# Patient Record
Sex: FEMALE | Race: WHITE | Employment: FULL TIME | URBAN - METROPOLITAN AREA
[De-identification: names, ages, dates, MRNs, and addresses within clinical notes are randomized per-mention and may not be internally consistent; named-entity substitution may affect disease eponyms.]

---

## 2017-06-19 RX ORDER — CLOBETASOL PROPIONATE 0.5 MG/G
CREAM TOPICAL
Qty: 30 G | Refills: 0 | Status: SHIPPED | OUTPATIENT
Start: 2017-06-19 | End: 2017-07-24 | Stop reason: SDUPTHER

## 2017-06-19 RX ORDER — CLOBETASOL PROPIONATE 0.5 MG/G
CREAM TOPICAL
Qty: 30 G | Refills: 1 | OUTPATIENT
Start: 2017-06-19

## 2017-06-19 NOTE — TELEPHONE ENCOUNTER
AE scheduled 07/24/2017. Patient aware that rx was sent to pharmacy of choice. Patient also thought she would need a refill of her OCP's but after looking at the calendar, patient thinks she will be ok. Patient advised to contact our office back if she finds that she will run out of her pills before her appointment. Patient verbalized understanding.

## 2017-07-24 ENCOUNTER — OFFICE VISIT (OUTPATIENT)
Dept: OBGYN CLINIC | Age: 47
End: 2017-07-24

## 2017-07-24 VITALS
BODY MASS INDEX: 26.43 KG/M2 | SYSTOLIC BLOOD PRESSURE: 130 MMHG | HEIGHT: 67 IN | WEIGHT: 168.4 LBS | DIASTOLIC BLOOD PRESSURE: 78 MMHG

## 2017-07-24 DIAGNOSIS — Z01.419 ENCOUNTER FOR GYNECOLOGICAL EXAMINATION (GENERAL) (ROUTINE) WITHOUT ABNORMAL FINDINGS: Primary | ICD-10-CM

## 2017-07-24 DIAGNOSIS — N90.4 LICHEN SCLEROSUS ET ATROPHICUS OF THE VULVA: ICD-10-CM

## 2017-07-24 RX ORDER — NORGESTIMATE AND ETHINYL ESTRADIOL 7DAYSX3 28
1 KIT ORAL DAILY
Qty: 3 PACKAGE | Refills: 4 | Status: SHIPPED | OUTPATIENT
Start: 2017-07-24 | End: 2018-07-26 | Stop reason: SDUPTHER

## 2017-07-24 RX ORDER — CLOBETASOL PROPIONATE 0.5 MG/G
CREAM TOPICAL
Qty: 60 G | Refills: 2 | Status: SHIPPED | OUTPATIENT
Start: 2017-07-24 | End: 2018-08-13 | Stop reason: SDUPTHER

## 2017-07-24 NOTE — PROGRESS NOTES
Jung Dodge is a ,  56 y.o. female [facility]  2017 who presents for her annual checkup. She is having no significant problems. Menstrual status:    Her periods are normal in flow. She is using one to two pads or tampons per day, usually regular and occur every 26-30 days. She denies dysmenorrhea. She reports no premenstrual symptoms. The patient is not using HRT. Contraception:    The current method of family planning is OCP (estrogen/progesterone). Sexual history:    She  reports that she currently engages in sexual activity and has had male partners. She reports using the following method of birth control/protection: Pill. Medical conditions:    Since her last annual GYN exam about one year ago, she has had the following changes in her health history: none. Pap and Mammogram History:    Her most recent Pap smear was normal, HPV neg obtained 16 . The patient had a recent mammogram 2017 which was negative for malignancy. Breast Cancer History/Substance Abuse:    She has no family history of breast cancer. Osteoporosis History:    Family history does not include a first or second degree relative with osteopenia or osteoporosis. A bone density scan was not previously obtained. She is currently taking calcium and vit D. Past Medical History:   Diagnosis Date    Acne     Depression     Lichen sclerosus     OCD (obsessive compulsive disorder)      Past Surgical History:   Procedure Laterality Date    HX BREAST BIOPSY  3/21/2012    normal path    HX OOPHORECTOMY Right 2004     Current Outpatient Prescriptions   Medication Sig Dispense Refill    clobetasol (TEMOVATE) 0.05 % topical cream APPLY TO AFFECTED AREA 2 TIMES A WEEK AS DIRECTED 30 g 0    norgestimate-ethinyl estradiol (TRI-PREVIFEM, 28,) 0.18/0.215/0.25 mg-35 mcg (28) tab Take 1 Tab by mouth daily.  3 Package 4    fluvoxaMINE (LUVOX) 100 mg tablet        Allergies: Review of patient's allergies indicates no known allergies. Social History     Social History    Marital status:      Spouse name: N/A    Number of children: N/A    Years of education: N/A     Occupational History    Not on file. Social History Main Topics    Smoking status: Never Smoker    Smokeless tobacco: Never Used    Alcohol use Not on file    Drug use: Not on file    Sexual activity: Yes     Partners: Male     Birth control/ protection: Pill     Other Topics Concern    Not on file     Social History Narrative     Tobacco History:  reports that she has never smoked. She has never used smokeless tobacco.  Alcohol Abuse:  has no alcohol history on file. Drug Abuse:  has no drug history on file. There is no problem list on file for this patient.         Review of Systems - History obtained from the patient  Constitutional: negative for weight loss, fever, night sweats  HEENT: negative for hearing loss, earache, congestion, snoring, sorethroat  CV: negative for chest pain, palpitations, edema  Resp: negative for cough, shortness of breath, wheezing  GI: negative for change in bowel habits, abdominal pain, black or bloody stools  : negative for frequency, dysuria, hematuria, vaginal discharge  MSK: negative for back pain, joint pain, muscle pain  Breast: negative for breast lumps, nipple discharge, galactorrhea  Skin :negative for itching, rash, hives  Neuro: negative for dizziness, headache, confusion, weakness  Psych: negative for anxiety, depression, change in mood  Heme/lymph: negative for bleeding, bruising, pallor    Physical Exam    Visit Vitals    /78 (BP 1 Location: Right arm, BP Patient Position: Sitting)    Ht 5' 7\" (1.702 m)    Wt 168 lb 6.4 oz (76.4 kg)    LMP 06/21/2017    BMI 26.38 kg/m2     Constitutional  · Appearance: well-nourished, well developed, alert, in no acute distress    HENT  · Head and Face: appears normal    Neck  · Inspection/Palpation: normal appearance, no masses or tenderness  · Lymph Nodes: no lymphadenopathy present  · Thyroid: gland size normal, nontender, no nodules or masses present on palpation    Chest  · Respiratory Effort: breathing normal  · Auscultation: normal breath sounds    Cardiovascular  · Heart:  · Auscultation: regular rate and rhythm without murmur    Breasts  · Inspection of Breasts: breasts symmetrical, no skin changes, no discharge present, nipple appearance normal, no skin retraction present  · Palpation of Breasts and Axillae: no masses present on palpation, no breast tenderness  · Axillary Lymph Nodes: no lymphadenopathy present    Gastrointestinal  · Abdominal Examination: abdomen non-tender to palpation, normal bowel sounds, no masses present  · Liver and spleen: no hepatomegaly present, spleen not palpable  · Hernias: no hernias identified    Skin  · General Inspection: no rash, no lesions identified    Neurologic/Psychiatric  · Mental Status:  · Orientation: grossly oriented to person, place and time  · Mood and Affect: mood normal, affect appropriate    Genitourinary  · External Genitalia: normal appearance for age, no discharge present, no tenderness present, no inflammatory lesions present, no masses present, no atrophy present  · Vagina: normal vaginal vault without central or paravaginal defects, no discharge present, no inflammatory lesions present, no masses present  · Bladder: non-tender to palpation  · Urethra: appears normal  · Cervix: normal   · Uterus: normal size, shape and consistency  · Adnexa: no adnexal tenderness present, no adnexal masses present  · Perineum: perineum within normal limits, no evidence of trauma, no rashes or skin lesions present  · Anus: anus within normal limits, no hemorrhoids present  · Inguinal Lymph Nodes: no lymphadenopathy present    Assessment:  Routine gynecologic examination  Her current medical status is satisfactory with no evidence of significant gynecologic issues.   Lichen sclerosus  Plan:  Counseled re: diet, exercise, healthy lifestyle  Return for yearly wellness visits  Rec annual mammogram  Cont OC  Refill clobetasol

## 2017-07-24 NOTE — PATIENT INSTRUCTIONS
Breast Self-Exam: Care Instructions  Your Care Instructions  A breast self-exam is when you check your breasts for lumps or changes. This regular exam helps you learn how your breasts normally look and feel. Most breast problems or changes are not because of cancer. Breast self-exam is not a substitute for a mammogram. Having regular breast exams by your doctor and regular mammograms improve your chances of finding any problems with your breasts. Some women set a time each month to do a step-by-step breast self-exam. Other women like a less formal system. They might look at their breasts as they brush their teeth, or feel their breasts once in a while in the shower. If you notice a change in your breast, tell your doctor. Follow-up care is a key part of your treatment and safety. Be sure to make and go to all appointments, and call your doctor if you are having problems. Its also a good idea to know your test results and keep a list of the medicines you take. How do you do a breast self-exam?  · The best time to examine your breasts is usually one week after your menstrual period begins. Your breasts should not be tender then. If you do not have periods, you might do your exam on a day of the month that is easy to remember. · To examine your breasts:  ¨ Remove all your clothes above the waist and lie down. When you are lying down, your breast tissue spreads evenly over your chest wall, which makes it easier to feel all your breast tissue. ¨ Use the pads--not the fingertips--of the 3 middle fingers of your left hand to check your right breast. Move your fingers slowly in small coin-sized circles that overlap. ¨ Use three levels of pressure to feel of all your breast tissue. Use light pressure to feel the tissue close to the skin surface. Use medium pressure to feel a little deeper. Use firm pressure to feel your tissue close to your breastbone and ribs.  Use each pressure level to feel your breast tissue before moving on to the next spot. ¨ Check your entire breast, moving up and down as if following a strip from the collarbone to the bra line, and from the armpit to the ribs. Repeat until you have covered the entire breast.  ¨ Repeat this procedure for your left breast, using the pads of the 3 middle fingers of your right hand. · To examine your breasts while in the shower:  ¨ Place one arm over your head and lightly soap your breast on that side. ¨ Using the pads of your fingers, gently move your hand over your breast (in the strip pattern described above), feeling carefully for any lumps or changes. ¨ Repeat for the other breast.  · Have your doctor inspect anything you notice to see if you need further testing. Where can you learn more? Go to http://mariama-gely.info/. Enter P148 in the search box to learn more about \"Breast Self-Exam: Care Instructions. \"  Current as of: July 26, 2016  Content Version: 11.3  © 9527-0283 [employer]. Care instructions adapted under license by [employer] (which disclaims liability or warranty for this information). If you have questions about a medical condition or this instruction, always ask your healthcare professional. [place] [address] any warranty or liability for your use of this information.

## 2018-07-25 RX ORDER — NORGESTIMATE AND ETHINYL ESTRADIOL 7DAYSX3 28
KIT ORAL
Qty: 84 TAB | Refills: 3 | OUTPATIENT
Start: 2018-07-25

## 2018-07-26 RX ORDER — NORGESTIMATE AND ETHINYL ESTRADIOL 7DAYSX3 28
1 KIT ORAL DAILY
Qty: 1 PACKAGE | Refills: 0 | Status: SHIPPED | OUTPATIENT
Start: 2018-07-26 | End: 2018-08-13 | Stop reason: SDUPTHER

## 2018-07-26 NOTE — TELEPHONE ENCOUNTER
AE scheduled 8/13/18 and patient needs 1 pack to hold her over until the appt time. rx sent per Md order, patient aware.

## 2018-08-13 ENCOUNTER — OFFICE VISIT (OUTPATIENT)
Dept: OBGYN CLINIC | Age: 48
End: 2018-08-13

## 2018-08-13 VITALS
BODY MASS INDEX: 26.78 KG/M2 | WEIGHT: 170.6 LBS | DIASTOLIC BLOOD PRESSURE: 70 MMHG | SYSTOLIC BLOOD PRESSURE: 118 MMHG | HEIGHT: 67 IN

## 2018-08-13 DIAGNOSIS — N90.4 LICHEN SCLEROSUS ET ATROPHICUS OF THE VULVA: ICD-10-CM

## 2018-08-13 DIAGNOSIS — Z01.419 ENCOUNTER FOR GYNECOLOGICAL EXAMINATION WITHOUT ABNORMAL FINDING: Primary | ICD-10-CM

## 2018-08-13 RX ORDER — CLOBETASOL PROPIONATE 0.5 MG/G
CREAM TOPICAL
Qty: 60 G | Refills: 2 | Status: SHIPPED | OUTPATIENT
Start: 2018-08-13 | End: 2019-10-16 | Stop reason: SDUPTHER

## 2018-08-13 RX ORDER — NORGESTIMATE AND ETHINYL ESTRADIOL 7DAYSX3 28
1 KIT ORAL DAILY
Qty: 3 PACKAGE | Refills: 4 | Status: SHIPPED | OUTPATIENT
Start: 2018-08-13 | End: 2019-10-10 | Stop reason: SDUPTHER

## 2018-08-13 NOTE — PATIENT INSTRUCTIONS
Breast Self-Exam: Care Instructions  Your Care Instructions    A breast self-exam is when you check your breasts for lumps or changes. This regular exam helps you learn how your breasts normally look and feel. Most breast problems or changes are not because of cancer. Breast self-exam is not a substitute for a mammogram. Having regular breast exams by your doctor and regular mammograms improve your chances of finding any problems with your breasts. Some women set a time each month to do a step-by-step breast self-exam. Other women like a less formal system. They might look at their breasts as they brush their teeth, or feel their breasts once in a while in the shower. If you notice a change in your breast, tell your doctor. Follow-up care is a key part of your treatment and safety. Be sure to make and go to all appointments, and call your doctor if you are having problems. It's also a good idea to know your test results and keep a list of the medicines you take. How do you do a breast self-exam?  · The best time to examine your breasts is usually one week after your menstrual period begins. Your breasts should not be tender then. If you do not have periods, you might do your exam on a day of the month that is easy to remember. · To examine your breasts:  ¨ Remove all your clothes above the waist and lie down. When you are lying down, your breast tissue spreads evenly over your chest wall, which makes it easier to feel all your breast tissue. ¨ Use the pads-not the fingertips-of the 3 middle fingers of your left hand to check your right breast. Move your fingers slowly in small coin-sized circles that overlap. ¨ Use three levels of pressure to feel of all your breast tissue. Use light pressure to feel the tissue close to the skin surface. Use medium pressure to feel a little deeper. Use firm pressure to feel your tissue close to your breastbone and ribs.  Use each pressure level to feel your breast tissue before moving on to the next spot. ¨ Check your entire breast, moving up and down as if following a strip from the collarbone to the bra line, and from the armpit to the ribs. Repeat until you have covered the entire breast.  ¨ Repeat this procedure for your left breast, using the pads of the 3 middle fingers of your right hand. · To examine your breasts while in the shower:  ¨ Place one arm over your head and lightly soap your breast on that side. ¨ Using the pads of your fingers, gently move your hand over your breast (in the strip pattern described above), feeling carefully for any lumps or changes. ¨ Repeat for the other breast.  · Have your doctor inspect anything you notice to see if you need further testing. Where can you learn more? Go to http://mariama-gely.info/. Enter P148 in the search box to learn more about \"Breast Self-Exam: Care Instructions. \"  Current as of: May 12, 2017  Content Version: 11.7  © 5829-2185 [employer]. Care instructions adapted under license by [employer] (which disclaims liability or warranty for this information). If you have questions about a medical condition or this instruction, always ask your healthcare professional. [place] [address] any warranty or liability for your use of this information.

## 2018-08-13 NOTE — PROGRESS NOTES
Reddy Hernandez is a ,  53 y.o. female [place] whose LMP was on 2018 who presents for her annual checkup. She has noticed some rectal irritation. Patient states she noticed \"pimple-like\" bumps where her underwear hits  She was putting hemorrhoid cream on it. Menstrual status:    Her periods are normal in flow. She is using one to two pads or tampons per day, usually regular and occur every 26-30 days. She denies dysmenorrhea. She reports no premenstrual symptoms. The patient is not using HRT. Contraception:    The current method of family planning is OCP (estrogen/progesterone). Sexual history:    She  reports that she currently engages in sexual activity and has had male partners. She reports using the following method of birth control/protection: Pill. Medical conditions:    Since her last annual GYN exam about one year ago, she has had the following changes in her health history: none. Pap and Mammogram History:    Her most recent Pap smear was normal, HPV neg obtained 2016. The patient had a recent mammogram 2018 which was negative for malignancy. Breast Cancer History/Substance Abuse:    She has no family history of breast cancer. Osteoporosis History:    Family history does not include a first or second degree relative with osteopenia or osteoporosis. She is not currently taking calcium and vit D. Past Medical History:   Diagnosis Date    Acne     Depression     Lichen sclerosus     OCD (obsessive compulsive disorder)      Past Surgical History:   Procedure Laterality Date    HX BREAST BIOPSY  3/21/2012    normal path    HX OOPHORECTOMY Right 2004     Current Outpatient Prescriptions   Medication Sig Dispense Refill    norgestimate-ethinyl estradiol (TRI-PREVIFEM, 28,) 0.18/0.215/0.25 mg-35 mcg (28) tab Take 1 Tab by mouth daily.  1 Package 0    clobetasol (TEMOVATE) 0.05 % topical cream APPLY TO AFFECTED AREA 2 TIMES A WEEK AS DIRECTED 60 g 2    fluvoxaMINE (LUVOX) 100 mg tablet        Allergies: Review of patient's allergies indicates no known allergies. Social History     Social History    Marital status:      Spouse name: N/A    Number of children: N/A    Years of education: N/A     Occupational History    Not on file. Social History Main Topics    Smoking status: Never Smoker    Smokeless tobacco: Never Used    Alcohol use Not on file    Drug use: Not on file    Sexual activity: Yes     Partners: Male     Birth control/ protection: Pill     Other Topics Concern    Not on file     Social History Narrative     Tobacco History:  reports that she has never smoked. She has never used smokeless tobacco.  Alcohol Abuse:  has no alcohol history on file. Drug Abuse:  has no drug history on file.   Patient Active Problem List   Diagnosis Code    Lichen sclerosus et atrophicus of the vulva N90.4         Review of Systems - History obtained from the patient  Constitutional: negative for weight loss, fever, night sweats  HEENT: negative for hearing loss, earache, congestion, snoring, sorethroat  CV: negative for chest pain, palpitations, edema  Resp: negative for cough, shortness of breath, wheezing  GI: negative for change in bowel habits, abdominal pain, black or bloody stools  : negative for frequency, dysuria, hematuria, vaginal discharge  MSK: negative for back pain, joint pain, muscle pain  Breast: negative for breast lumps, nipple discharge, galactorrhea  Skin :negative for itching, rash, hives  Neuro: negative for dizziness, headache, confusion, weakness  Psych: negative for anxiety, depression, change in mood  Heme/lymph: negative for bleeding, bruising, pallor    Physical Exam    Visit Vitals    /70    Ht 5' 7\" (1.702 m)    Wt 170 lb 9.6 oz (77.4 kg)    LMP 08/02/2018    BMI 26.72 kg/m2     Constitutional  · Appearance: well-nourished, well developed, alert, in no acute distress    HENT  · Head and Face: appears normal    Neck  · Inspection/Palpation: normal appearance, no masses or tenderness  · Lymph Nodes: no lymphadenopathy present  · Thyroid: gland size normal, nontender, no nodules or masses present on palpation    Chest  · Respiratory Effort: breathing normal  · Auscultation: normal breath sounds    Cardiovascular  · Heart:  · Auscultation: regular rate and rhythm without murmur    Breasts  · Inspection of Breasts: breasts symmetrical, no skin changes, no discharge present, nipple appearance normal, no skin retraction present  · Palpation of Breasts and Axillae: no masses present on palpation, no breast tenderness  · Axillary Lymph Nodes: no lymphadenopathy present    Gastrointestinal  · Abdominal Examination: abdomen non-tender to palpation, normal bowel sounds, no masses present  · Liver and spleen: no hepatomegaly present, spleen not palpable  · Hernias: no hernias identified    Skin  · General Inspection: no rash, no lesions identified    Neurologic/Psychiatric  · Mental Status:  · Orientation: grossly oriented to person, place and time  · Mood and Affect: mood normal, affect appropriate    Genitourinary  · External Genitalia: normal appearance for age, no discharge present, no tenderness present, no inflammatory lesions present, no masses present, no atrophy present, left bartholin  · Vagina: normal vaginal vault without central or paravaginal defects, no discharge present, no inflammatory lesions present, no masses present  · Bladder: non-tender to palpation  · Urethra: appears normal  · Cervix: normal   · Uterus: normal size, shape and consistency  · Adnexa: no adnexal tenderness present, no adnexal masses present  · Perineum: perineum within normal limits, no evidence of trauma, no rashes or skin lesions present  · Anus: anus within normal limits, no hemorrhoids present  · Inguinal Lymph Nodes: no lymphadenopathy present    Assessment:  Routine gynecologic examination  Her current medical status is satisfactory with no evidence of significant gynecologic issues.   Lichen sclerosus - looks clear today  Left bartholin stable  Plan:  Counseled re: diet, exercise, healthy lifestyle  Return for yearly wellness visits  Rec annual mammogram  Cont OC  Cont clobetasol

## 2018-08-29 RX ORDER — FLUVOXAMINE MALEATE 100 MG/1
TABLET, COATED ORAL
COMMUNITY
Start: 2018-07-29

## 2018-08-29 NOTE — TELEPHONE ENCOUNTER
Requested Prescriptions     Pending Prescriptions Disp Refills    fluvoxaMINE (LUVOX) 100 mg tablet       Sig: Take 1 Tab by mouth every evening. Hi ,  [facility] requesting Fluvoxamine Maleate 100mg tab.   Last seen 08/08/17  Last filled 05/17/18  [facility] [place]

## 2018-08-30 RX ORDER — FLUVOXAMINE MALEATE 100 MG/1
100 TABLET, COATED ORAL EVERY EVENING
OUTPATIENT
Start: 2018-08-30

## 2019-03-20 ENCOUNTER — OFFICE VISIT (OUTPATIENT)
Dept: OBGYN CLINIC | Age: 49
End: 2019-03-20

## 2019-03-20 VITALS
WEIGHT: 170.6 LBS | HEIGHT: 67 IN | DIASTOLIC BLOOD PRESSURE: 100 MMHG | SYSTOLIC BLOOD PRESSURE: 150 MMHG | BODY MASS INDEX: 26.78 KG/M2

## 2019-03-20 DIAGNOSIS — N92.1 BREAKTHROUGH BLEEDING ON BIRTH CONTROL PILLS: ICD-10-CM

## 2019-03-20 DIAGNOSIS — R10.2 PELVIC PAIN: Primary | ICD-10-CM

## 2019-03-20 NOTE — PATIENT INSTRUCTIONS

## 2019-03-20 NOTE — PROGRESS NOTES
Pelvic Pain evaluation    Kiran Rangel is a 53 y.o. female who complains of pelvic pain. The pain is described as aching and fullness-feeling, and is 3/10 in intensity. She notices this pain around the middle of her cycle. Once her period starts-she feels better. Pain is located in the suprapubic without radiation. The pain started a few months ago. Her symptoms have been unchanged since. Aggravating factors: intercourse. Patient states she has noticed pain with IC recently. Alleviating factors: none. Associated symptoms: none. The patient denies constipation. Patient states she wonders if she has endometriosis again. Dr. Ayana Payne told her she had this around 2013 (before her 1st child) and was given medication to treat. She has also noticed that her periods are heavier over the last couple of months. She is on Tricyclen. Has had issues with uterine prolapse/actually long cervix in past. Thinks she has spotting with hard BM from vagina. Normal colonoscopy 2 years ago due to family hx of colon cancer. Past Medical History:   Diagnosis Date    Acne     Depression     Lichen sclerosus     OCD (obsessive compulsive disorder)      Past Surgical History:   Procedure Laterality Date    HX BREAST BIOPSY  3/21/2012    normal path    HX OOPHORECTOMY Right 6/2004     Social History     Occupational History    Not on file   Tobacco Use    Smoking status: Never Smoker    Smokeless tobacco: Never Used   Substance and Sexual Activity    Alcohol use: Not on file    Drug use: Not on file    Sexual activity: Yes     Partners: Male     Birth control/protection: Pill     Family History   Problem Relation Age of Onset    Hypertension Mother     Cancer Mother         uterine CA    Hypertension Father     Cancer Father         Kidney CA       No Known Allergies  Prior to Admission medications    Medication Sig Start Date End Date Taking?  Authorizing Provider   fluvoxaMINE (LUVOX) 100 mg tablet  7/29/18  Yes Provider, Historical   norgestimate-ethinyl estradiol (TRI-PREVIFEM, 28,) 0.18/0.215/0.25 mg-35 mcg (28) tab Take 1 Tab by mouth daily.  8/13/18  Yes Juan David Bourgeois MD   clobetasol (TEMOVATE) 0.05 % topical cream APPLY TO AFFECTED AREA 2 TIMES A WEEK AS DIRECTED 8/13/18  Yes Juan David Bourgeois MD        Review of Systems: History obtained from the patient  Constitutional: negative for weight loss, fever, night sweats  Breast: negative for breast lumps, nipple discharge, galactorrhea  GI: negative for change in bowel habits, abdominal pain, black or bloody stools  : negative for frequency, dysuria, hematuria, vaginal discharge  MSK: negative for back pain, joint pain, muscle pain  Skin: negative for itching, rash, hives  Psych: negative for anxiety, depression, change in mood      Objective:    Visit Vitals  BP (!) 150/100 (BP 1 Location: Right arm, BP Patient Position: Sitting)   Ht 5' 7\" (1.702 m)   Wt 170 lb 9.6 oz (77.4 kg)   LMP 03/01/2019 (Approximate)   BMI 26.72 kg/m²       Physical Exam:     Constitutional  · Appearance: well-nourished, well developed, alert, in no acute distress    Gastrointestinal  · Abdominal Examination: abdomen non-tender to palpation, normal bowel sounds, no masses present  · Liver and spleen: no hepatomegaly present, spleen not palpable  · Hernias: no hernias identified    Genitourinary  · External Genitalia: normal appearance for age, no discharge present, no tenderness present, no inflammatory lesions present, no masses present, no atrophy present, bartholin cyst  · Vagina: normal vaginal vault without central or paravaginal defects, no discharge present, no inflammatory lesions present, no masses present  · Bladder: non-tender to palpation  · Urethra: appears normal  · Cervix: long, near introitus but not to introitus with valsalva  · Uterus: normal size, shape and consistency  · Adnexa: no adnexal tenderness present, no adnexal masses present  · Perineum: perineum within normal limits, no evidence of trauma, no rashes or skin lesions present  · Anus: anus within normal limits, no hemorrhoids present  · Inguinal Lymph Nodes: no lymphadenopathy present    Skin  · General Inspection: no rash, no lesions identified    Neurologic/Psychiatric  · Mental Status:  · Orientation: grossly oriented to person, place and time  · Mood and Affect: mood normal, affect appropriate    Assessment:  BTB on OC  HTN  Pelvic pain    Plan:   US to check uterine lining  Disc bowel regimen  Stop OC now due to HTN      RTO prn if symptoms persist or worsen. Instructions given to pt. Handouts given to pt.

## 2019-03-29 ENCOUNTER — OFFICE VISIT (OUTPATIENT)
Dept: OBGYN CLINIC | Age: 49
End: 2019-03-29

## 2019-03-29 VITALS — SYSTOLIC BLOOD PRESSURE: 118 MMHG | WEIGHT: 166 LBS | DIASTOLIC BLOOD PRESSURE: 82 MMHG | BODY MASS INDEX: 26 KG/M2

## 2019-03-29 DIAGNOSIS — R10.2 PELVIC PAIN: Primary | ICD-10-CM

## 2019-03-29 NOTE — PROGRESS NOTES
Pelvic Pain evaluation    Jarett Bolanos is a 53 y.o. female who complains of pelvic pain. The pain is described as aching and burning, and is 3/10 in intensity. Pain is located in the LLQ without radiation. Saw PCP and is now on low dose hypertensive med    The pain started 2 months ago. Her symptoms have been unchanged since. Aggravating factors: none. Alleviating factors: none. Associated symptoms: none. Ultrasound done and shows:  UTERUS IS RETROVERTED, NORMAL IN SIZE AND ECHOGENICITY. ENDOMETRIUM MEASURES 3-4MM IN THICKNESS. NO EVIDENCE OF MASS OR ABNORMALITY SEEN  WITHIN THE ENDOMETRIAL CAVITY. RIGHT OVARY IS SURGICALLY ABSENT. RIGHT ADNEXA APPEARS WITHIN NORMAL LIMITS. LEFT OVARY APPEARS WITHIN NORMAL LIMITS. A FOLLICULAR CYST IS SEEN. NO FREE FLUID SEEN IN THE CDS. Past Medical History:   Diagnosis Date    Acne     Depression     Lichen sclerosus     OCD (obsessive compulsive disorder)      Past Surgical History:   Procedure Laterality Date    HX BREAST BIOPSY  3/21/2012    normal path    HX OOPHORECTOMY Right 6/2004     Social History     Occupational History    Not on file   Tobacco Use    Smoking status: Never Smoker    Smokeless tobacco: Never Used   Substance and Sexual Activity    Alcohol use: Not on file    Drug use: Not on file    Sexual activity: Yes     Partners: Male     Birth control/protection: Pill     Family History   Problem Relation Age of Onset    Hypertension Mother     Cancer Mother         uterine CA    Hypertension Father     Cancer Father         Kidney CA       No Known Allergies  Prior to Admission medications    Medication Sig Start Date End Date Taking? Authorizing Provider   fluvoxaMINE (LUVOX) 100 mg tablet  7/29/18  Yes Provider, Historical   norgestimate-ethinyl estradiol (TRI-PREVIFEM, 28,) 0.18/0.215/0.25 mg-35 mcg (28) tab Take 1 Tab by mouth daily.  8/13/18  Yes Felix Plummer MD   clobetasol (TEMOVATE) 0.05 % topical cream APPLY TO AFFECTED AREA 2 TIMES A WEEK AS DIRECTED 8/13/18  Yes Arthur Akins MD        Review of Systems: History obtained from the patient  Constitutional: negative for weight loss, fever, night sweats  Breast: negative for breast lumps, nipple discharge, galactorrhea  GI: negative for change in bowel habits, abdominal pain, black or bloody stools  : negative for frequency, dysuria, hematuria, vaginal discharge  MSK: negative for back pain, joint pain, muscle pain  Skin: negative for itching, rash, hives  Psych: negative for anxiety, depression, change in mood      Objective:    Visit Vitals  /82   Wt 166 lb (75.3 kg)   LMP 03/01/2019 (Approximate)   BMI 26.00 kg/m²       Physical Exam:     Constitutional  · Appearance: well-nourished, well developed, alert, in no acute distress    Skin  · General Inspection: no rash, no lesions identified    Neurologic/Psychiatric  · Mental Status:  · Orientation: grossly oriented to person, place and time  · Mood and Affect: mood normal, affect appropriate    Assessment:  Pelvic pain  HTN    Plan:   Can stay on OC - BP well controlled  Disc bowel regimen -see PCP if no better      RTO prn if symptoms persist or worsen. Instructions given to pt. Handouts given to pt.

## 2019-10-16 ENCOUNTER — OFFICE VISIT (OUTPATIENT)
Dept: OBGYN CLINIC | Age: 49
End: 2019-10-16

## 2019-10-16 VITALS
HEIGHT: 67 IN | DIASTOLIC BLOOD PRESSURE: 79 MMHG | WEIGHT: 167 LBS | SYSTOLIC BLOOD PRESSURE: 119 MMHG | BODY MASS INDEX: 26.21 KG/M2

## 2019-10-16 DIAGNOSIS — Z01.419 ENCOUNTER FOR GYNECOLOGICAL EXAMINATION (GENERAL) (ROUTINE) WITHOUT ABNORMAL FINDINGS: Primary | ICD-10-CM

## 2019-10-16 RX ORDER — CLOBETASOL PROPIONATE 0.5 MG/G
CREAM TOPICAL
Qty: 60 G | Refills: 2 | Status: SHIPPED | OUTPATIENT
Start: 2019-10-16 | End: 2020-11-25 | Stop reason: SDUPTHER

## 2019-10-16 RX ORDER — NORGESTIMATE AND ETHINYL ESTRADIOL 7DAYSX3 28
1 KIT ORAL DAILY
Qty: 3 PACKAGE | Refills: 4 | Status: SHIPPED | OUTPATIENT
Start: 2019-10-16 | End: 2020-11-05 | Stop reason: SINTOL

## 2019-10-16 RX ORDER — LISINOPRIL AND HYDROCHLOROTHIAZIDE 12.5; 2 MG/1; MG/1
TABLET ORAL DAILY
COMMUNITY

## 2019-10-16 NOTE — PATIENT INSTRUCTIONS
Well Visit, Ages 25 to 48: Care Instructions  Your Care Instructions    Physical exams can help you stay healthy. Your doctor has checked your overall health and may have suggested ways to take good care of yourself. He or she also may have recommended tests. At home, you can help prevent illness with healthy eating, regular exercise, and other steps. Follow-up care is a key part of your treatment and safety. Be sure to make and go to all appointments, and call your doctor if you are having problems. It's also a good idea to know your test results and keep a list of the medicines you take. How can you care for yourself at home? · Reach and stay at a healthy weight. This will lower your risk for many problems, such as obesity, diabetes, heart disease, and high blood pressure. · Get at least 30 minutes of physical activity on most days of the week. Walking is a good choice. You also may want to do other activities, such as running, swimming, cycling, or playing tennis or team sports. Discuss any changes in your exercise program with your doctor. · Do not smoke or allow others to smoke around you. If you need help quitting, talk to your doctor about stop-smoking programs and medicines. These can increase your chances of quitting for good. · Talk to your doctor about whether you have any risk factors for sexually transmitted infections (STIs). Having one sex partner (who does not have STIs and does not have sex with anyone else) is a good way to avoid these infections. · Use birth control if you do not want to have children at this time. Talk with your doctor about the choices available and what might be best for you. · Protect your skin from too much sun. When you're outdoors from 10 a.m. to 4 p.m., stay in the shade or cover up with clothing and a hat with a wide brim. Wear sunglasses that block UV rays. Even when it's cloudy, put broad-spectrum sunscreen (SPF 30 or higher) on any exposed skin.   · See a dentist one or two times a year for checkups and to have your teeth cleaned. · Wear a seat belt in the car. Follow your doctor's advice about when to have certain tests. These tests can spot problems early. For everyone  · Cholesterol. Have the fat (cholesterol) in your blood tested after age 21. Your doctor will tell you how often to have this done based on your age, family history, or other things that can increase your risk for heart disease. · Blood pressure. Have your blood pressure checked during a routine doctor visit. Your doctor will tell you how often to check your blood pressure based on your age, your blood pressure results, and other factors. · Vision. Talk with your doctor about how often to have a glaucoma test.  · Diabetes. Ask your doctor whether you should have tests for diabetes. · Colon cancer. Your risk for colorectal cancer gets higher as you get older. Some experts say that adults should start regular screening at age 48 and stop at age 76. Others say to start before age 48 or continue after age 76. Talk with your doctor about your risk and when to start and stop screening. For women  · Breast exam and mammogram. Talk to your doctor about when you should have a clinical breast exam and a mammogram. Medical experts differ on whether and how often women under 50 should have these tests. Your doctor can help you decide what is right for you. · Cervical cancer screening test and pelvic exam. Begin with a Pap test at age 24. The test often is part of a pelvic exam. Starting at age 27, you may choose to have a Pap test, an HPV test, or both tests at the same time (called co-testing). Talk with your doctor about how often to have testing. · Tests for sexually transmitted infections (STIs). Ask whether you should have tests for STIs. You may be at risk if you have sex with more than one person, especially if your partners do not wear condoms.   For men  · Tests for sexually transmitted infections (STIs). Ask whether you should have tests for STIs. You may be at risk if you have sex with more than one person, especially if you do not wear a condom. · Testicular cancer exam. Ask your doctor whether you should check your testicles regularly. · Prostate exam. Talk to your doctor about whether you should have a blood test (called a PSA test) for prostate cancer. Experts differ on whether and when men should have this test. Some experts suggest it if you are older than 39 and are -American or have a father or brother who got prostate cancer when he was younger than 72. When should you call for help? Watch closely for changes in your health, and be sure to contact your doctor if you have any problems or symptoms that concern you. Where can you learn more? Go to http://mariama-gely.info/. Enter P072 in the search box to learn more about \"Well Visit, Ages 25 to 48: Care Instructions. \"  Current as of: December 13, 2018  Content Version: 12.2  © 6128-0225 [employer]. Care instructions adapted under license by [employer] (which disclaims liability or warranty for this information). If you have questions about a medical condition or this instruction, always ask your healthcare professional. [place] [address] any warranty or liability for your use of this information.

## 2019-10-16 NOTE — PROGRESS NOTES
Fay Sargent is a ,  51 y.o. female [facility] whose LMP was on 2019 who presents for her annual checkup. She is having no significant problems. Now on BP meds    Menstrual status:    Her periods are light, minimal to nonexistent in flow. She is using one to two pads or tampons per day, usually regular occurring every 26-30 days. She denies dysmenorrhea. She reports no premenstrual symptoms. The patient is not using HRT. Contraception:    The current method of family planning is OCP (estrogen/progesterone). Sexual history:    She  reports that she currently engages in sexual activity and has had partner(s) who are Male. She reports using the following method of birth control/protection: Pill. Medical conditions:    Since her last annual GYN exam about 2018 ago, she has had the following changes in her health history: none. Pap and Mammogram History:    Her most recent Pap smear was normal/-HPV obtained 2016 year(s) ago. The patient had a recent mammogram 6-3-2019 which was negative for malignancy. Breast Cancer History/Substance Abuse:    She has no and a family history of breast cancer. Osteoporosis History:    Family history does not include a first or second degree relative with osteopenia or osteoporosis. A bone density scan was not obtained. She is not currently taking calcium and vit D. Past Medical History:   Diagnosis Date    Acne     Depression     Lichen sclerosus     OCD (obsessive compulsive disorder)      Past Surgical History:   Procedure Laterality Date    HX BREAST BIOPSY  3/21/2012    normal path    HX OOPHORECTOMY Right 2004     Current Outpatient Medications   Medication Sig Dispense Refill    lisinopril-hydroCHLOROthiazide (PRINZIDE, ZESTORETIC) 20-12.5 mg per tablet Take  by mouth daily.       TRI-PREVIFEM, 28, 0.18/0.215/0.25 mg-35 mcg (28) tab TAKE 1 TABLET BY MOUTH EVERY DAY 1 Package 0    fluvoxaMINE (LUVOX) 100 mg tablet       clobetasol (TEMOVATE) 0.05 % topical cream APPLY TO AFFECTED AREA 2 TIMES A WEEK AS DIRECTED 60 g 2     Allergies: Patient has no known allergies. Social History     Socioeconomic History    Marital status:      Spouse name: Not on file    Number of children: Not on file    Years of education: Not on file    Highest education level: Not on file   Occupational History    Not on file   Social Needs    Financial resource strain: Not on file    Food insecurity:     Worry: Not on file     Inability: Not on file    Transportation needs:     Medical: Not on file     Non-medical: Not on file   Tobacco Use    Smoking status: Never Smoker    Smokeless tobacco: Never Used   Substance and Sexual Activity    Alcohol use: Not on file    Drug use: Not on file    Sexual activity: Yes     Partners: Male     Birth control/protection: Pill   Lifestyle    Physical activity:     Days per week: Not on file     Minutes per session: Not on file    Stress: Not on file   Relationships    Social connections:     Talks on phone: Not on file     Gets together: Not on file     Attends Yarsani service: Not on file     Active member of club or organization: Not on file     Attends meetings of clubs or organizations: Not on file     Relationship status: Not on file    Intimate partner violence:     Fear of current or ex partner: Not on file     Emotionally abused: Not on file     Physically abused: Not on file     Forced sexual activity: Not on file   Other Topics Concern    Not on file   Social History Narrative    Not on file     Tobacco History:  reports that she has never smoked. She has never used smokeless tobacco.  Alcohol Abuse:  has no alcohol history on file. Drug Abuse:  has no drug history on file.   Patient Active Problem List   Diagnosis Code    Lichen sclerosus et atrophicus of the vulva N90.4         Review of Systems - History obtained from the patient  Constitutional: negative for weight loss, fever, night sweats  HEENT: negative for hearing loss, earache, congestion, snoring, sorethroat  CV: negative for chest pain, palpitations, edema  Resp: negative for cough, shortness of breath, wheezing  GI: negative for change in bowel habits, abdominal pain, black or bloody stools  : negative for frequency, dysuria, hematuria, vaginal discharge  MSK: negative for back pain, joint pain, muscle pain  Breast: negative for breast lumps, nipple discharge, galactorrhea  Skin :negative for itching, rash, hives  Neuro: negative for dizziness, headache, confusion, weakness  Psych: negative for anxiety, depression, change in mood  Heme/lymph: negative for bleeding, bruising, pallor    Physical Exam    Visit Vitals  /79 (BP 1 Location: Right arm, BP Patient Position: Sitting)   Ht 5' 7\" (1.702 m)   Wt 167 lb (75.8 kg)   LMP 09/19/2019 (Approximate)   BMI 26.16 kg/m²     Constitutional  · Appearance: well-nourished, well developed, alert, in no acute distress    HENT  · Head and Face: appears normal    Neck  · Inspection/Palpation: normal appearance, no masses or tenderness  · Lymph Nodes: no lymphadenopathy present  · Thyroid: gland size normal, nontender, no nodules or masses present on palpation    Chest  · Respiratory Effort: breathing normal  · Auscultation: normal breath sounds    Cardiovascular  · Heart:  · Auscultation: regular rate and rhythm without murmur    Breasts  · Inspection of Breasts: breasts symmetrical, no skin changes, no discharge present, nipple appearance normal, no skin retraction present  · Palpation of Breasts and Axillae: no masses present on palpation, no breast tenderness  · Axillary Lymph Nodes: no lymphadenopathy present    Gastrointestinal  · Abdominal Examination: abdomen non-tender to palpation, normal bowel sounds, no masses present  · Liver and spleen: no hepatomegaly present, spleen not palpable  · Hernias: no hernias identified    Skin  · General Inspection: no rash, no lesions identified    Neurologic/Psychiatric  · Mental Status:  · Orientation: grossly oriented to person, place and time  · Mood and Affect: mood normal, affect appropriate    Genitourinary  · External Genitalia: normal appearance for age, no discharge present, no tenderness present, no inflammatory lesions present, no masses present, no atrophy present, left Bartholin cyst  · Vagina: normal vaginal vault without central or paravaginal defects, no discharge present, no inflammatory lesions present, no masses present  · Bladder: non-tender to palpation  · Urethra: appears normal  · Cervix: normal   · Uterus: normal size, shape and consistency  · Adnexa: no adnexal tenderness present, no adnexal masses present  · Perineum: perineum within normal limits, no evidence of trauma, no rashes or skin lesions present  · Anus: anus within normal limits, no hemorrhoids present  · Inguinal Lymph Nodes: no lymphadenopathy present    Assessment:  Routine gynecologic examination  CHTN  LSA    Plan:  Counseled re: diet, exercise, healthy lifestyle  Return for yearly wellness visits  Rec annual mammogram  Bartholin cyst stable  Cont clobetasol

## 2020-10-02 ENCOUNTER — OFFICE VISIT (OUTPATIENT)
Dept: OBGYN CLINIC | Age: 50
End: 2020-10-02
Payer: COMMERCIAL

## 2020-10-02 DIAGNOSIS — R30.0 DYSURIA: ICD-10-CM

## 2020-10-02 DIAGNOSIS — N94.9 VAGINAL BURNING: Primary | ICD-10-CM

## 2020-10-02 PROCEDURE — 99213 OFFICE O/P EST LOW 20 MIN: CPT | Performed by: OBSTETRICS & GYNECOLOGY

## 2020-10-02 RX ORDER — FLUCONAZOLE 150 MG/1
150 TABLET ORAL DAILY
Qty: 3 TAB | Refills: 0 | Status: SHIPPED | OUTPATIENT
Start: 2020-10-02 | End: 2020-10-05

## 2020-10-02 NOTE — PROGRESS NOTES
Chief Complaint   Vaginitis      HPI  53 y.o. female complains of burning/ irritation. .No LMP recorded. She denies additional symptoms at this time. The patient  denies aggravating factors  She denies exposure to new chemicals ot hygenic agents  Previous treatment included:  Macrobid for possible UTI     Past Medical History:   Diagnosis Date    Acne     Depression     Lichen sclerosus     OCD (obsessive compulsive disorder)      Past Surgical History:   Procedure Laterality Date    HX BREAST BIOPSY  3/21/2012    normal path    HX OOPHORECTOMY Right 6/2004     Social History     Occupational History    Not on file   Tobacco Use    Smoking status: Never Smoker    Smokeless tobacco: Never Used   Substance and Sexual Activity    Alcohol use: Not on file    Drug use: Not on file    Sexual activity: Yes     Partners: Male     Birth control/protection: Pill     Family History   Problem Relation Age of Onset    Hypertension Mother     Cancer Mother         uterine CA    Hypertension Father     Cancer Father         Kidney CA        No Known Allergies  Prior to Admission medications    Medication Sig Start Date End Date Taking? Authorizing Provider   lisinopril-hydroCHLOROthiazide (PRINZIDE, ZESTORETIC) 20-12.5 mg per tablet Take  by mouth daily. Provider, Historical   norgestimate-ethinyl estradiol (TRI-PREVIFEM, 28,) 0.18/0.215/0.25 mg-35 mcg (28) tab Take 1 Tab by mouth daily.  10/16/19   Marlys Mustafa MD   clobetasol (TEMOVATE) 0.05 % topical cream APPLY TO AFFECTED AREA 2 TIMES A WEEK AS DIRECTED 10/16/19   Marlys Mustafa MD   fluvoxaMINE (LUVOX) 100 mg tablet  7/29/18   Provider, Historical                      Review of Systems - History obtained from the patient  Constitutional: negative for weight loss, fever, night sweats  Breast: negative for breast lumps, nipple discharge, galactorrhea  GI: negative for change in bowel habits, abdominal pain, black or bloody stools  : negative for frequency, dysuria, hematuria  MSK: negative for back pain, joint pain, muscle pain  Skin: negative for itching, rash, hives  Neuro: negative for dizziness, headache, confusion, weakness  Psych: negative for anxiety, depression, change in mood  Heme/lymph: negative for bleeding, bruising, pallor       Objective:  Physical Exam:   PHYSICAL EXAMINATION    Constitutional  · Appearance: well-nourished, well developed, alert, in no acute distress    HENT  · Head and Face: appears normal    Genitourinary  · External Genitalia: normal appearance for age, + discharge present, no tenderness present, no inflammatory lesions present, no masses present, no atrophy present  · Vagina:  + discharge present, otherwise normal vaginal vault without central or paravaginal defects, no inflammatory lesions present, no masses present  · Bladder: non-tender to palpation  · Urethra: appears normal  · Cervix: normal   · Uterus: normal size, shape and consistency  · Adnexa: no adnexal tenderness present, no adnexal masses present  · Perineum: perineum within normal limits, no evidence of trauma, no rashes or skin lesions present  · Anus: anus within normal limits, no hemorrhoids present  · Inguinal Lymph Nodes: no lymphadenopathy present    Skin  · General Inspection: no rash, no lesions identified    Neurologic/Psychiatric  · Mental Status:  · Orientation: grossly oriented to person, place and time  · Mood and Affect: mood normal, affect appropriate    Assessment:   Vaginal burning- likely yeast, will treat with diflucan and f/u with nuswab  Lower abdominal discomfort- will f/u with urine culture, if no improvement then consider rto for ultrasound. Plan:   ROV prn if symptoms persist or worsen.

## 2020-10-04 LAB — BACTERIA UR CULT: NORMAL

## 2020-10-07 LAB
A VAGINAE DNA VAG QL NAA+PROBE: NORMAL SCORE
BVAB2 DNA VAG QL NAA+PROBE: NORMAL SCORE
C ALBICANS DNA VAG QL NAA+PROBE: NEGATIVE
C GLABRATA DNA VAG QL NAA+PROBE: NEGATIVE
MEGA1 DNA VAG QL NAA+PROBE: NORMAL SCORE

## 2020-10-19 ENCOUNTER — OFFICE VISIT (OUTPATIENT)
Dept: OBGYN CLINIC | Age: 50
End: 2020-10-19
Payer: COMMERCIAL

## 2020-10-19 VITALS
HEIGHT: 67 IN | BODY MASS INDEX: 27.06 KG/M2 | SYSTOLIC BLOOD PRESSURE: 131 MMHG | WEIGHT: 172.4 LBS | DIASTOLIC BLOOD PRESSURE: 83 MMHG

## 2020-10-19 DIAGNOSIS — Z01.419 ENCOUNTER FOR GYNECOLOGICAL EXAMINATION (GENERAL) (ROUTINE) WITHOUT ABNORMAL FINDINGS: Primary | ICD-10-CM

## 2020-10-19 DIAGNOSIS — Z11.51 SCREENING FOR HPV (HUMAN PAPILLOMAVIRUS): ICD-10-CM

## 2020-10-19 PROCEDURE — 99396 PREV VISIT EST AGE 40-64: CPT | Performed by: OBSTETRICS & GYNECOLOGY

## 2020-10-19 NOTE — PROGRESS NOTES
Mark Mathews is a ,  53 y.o. female [facility] whose LMP was on 10/15/2020 who presents for her annual checkup. Her mother dx with her third cancer (now breast, had colon and uterine)    Menstrual status:    Her periods are light in flow. She is taking OCP continuously     She denies dysmenorrhea. She reports no premenstrual symptoms. The patient is not using HRT. Contraception:    The current method of family planning is OCP (estrogen/progesterone). Sexual history:    She  reports being sexually active and has had partner(s) who are Male. She reports using the following method of birth control/protection: Pill. Medical conditions:    Since her last annual GYN exam about one year ago, she has had the following changes in her health history: none. Pap and Mammogram History:    Her most recent Pap smear was 2016 normal/HPV neg    The patient had a recent mammogram 2020 which was negative for malignancy. Breast Cancer History/Substance Abuse:    She has no family history of breast cancer. Osteoporosis History:    Family history does not include a first or second degree relative with osteopenia or osteoporosis. A bone density scan has never been obtained      Past Medical History:   Diagnosis Date    Acne     Depression     Lichen sclerosus     OCD (obsessive compulsive disorder)      Past Surgical History:   Procedure Laterality Date    HX BREAST BIOPSY  3/21/2012    normal path    HX OOPHORECTOMY Right 2004     Current Outpatient Medications   Medication Sig Dispense Refill    lisinopril-hydroCHLOROthiazide (PRINZIDE, ZESTORETIC) 20-12.5 mg per tablet Take  by mouth daily.  norgestimate-ethinyl estradiol (TRI-PREVIFEM, 28,) 0.18/0.215/0.25 mg-35 mcg (28) tab Take 1 Tab by mouth daily.  3 Package 4    clobetasol (TEMOVATE) 0.05 % topical cream APPLY TO AFFECTED AREA 2 TIMES A WEEK AS DIRECTED 60 g 2    fluvoxaMINE (LUVOX) 100 mg tablet Allergies: Patient has no known allergies. Social History     Socioeconomic History    Marital status:      Spouse name: Not on file    Number of children: Not on file    Years of education: Not on file    Highest education level: Not on file   Occupational History    Not on file   Social Needs    Financial resource strain: Not on file    Food insecurity     Worry: Not on file     Inability: Not on file    Transportation needs     Medical: Not on file     Non-medical: Not on file   Tobacco Use    Smoking status: Never Smoker    Smokeless tobacco: Never Used   Substance and Sexual Activity    Alcohol use: Not on file    Drug use: Not on file    Sexual activity: Yes     Partners: Male     Birth control/protection: Pill   Lifestyle    Physical activity     Days per week: Not on file     Minutes per session: Not on file    Stress: Not on file   Relationships    Social connections     Talks on phone: Not on file     Gets together: Not on file     Attends Worship service: Not on file     Active member of club or organization: Not on file     Attends meetings of clubs or organizations: Not on file     Relationship status: Not on file    Intimate partner violence     Fear of current or ex partner: Not on file     Emotionally abused: Not on file     Physically abused: Not on file     Forced sexual activity: Not on file   Other Topics Concern    Not on file   Social History Narrative    Not on file     Tobacco History:  reports that she has never smoked. She has never used smokeless tobacco.  Alcohol Abuse:  has no history on file for alcohol. Drug Abuse:  has no history on file for drug.   Patient Active Problem List   Diagnosis Code    Lichen sclerosus et atrophicus of the vulva N90.4         Review of Systems - History obtained from the patient  Constitutional: negative for weight loss, fever, night sweats  HEENT: negative for hearing loss, earache, congestion, snoring, sorethroat  CV: negative for chest pain, palpitations, edema  Resp: negative for cough, shortness of breath, wheezing  GI: negative for change in bowel habits, abdominal pain, black or bloody stools  : negative for frequency, dysuria, hematuria, vaginal discharge  MSK: negative for back pain, joint pain, muscle pain  Breast: negative for breast lumps, nipple discharge, galactorrhea  Skin :negative for itching, rash, hives  Neuro: negative for dizziness, headache, confusion, weakness  Psych: negative for anxiety, depression, change in mood  Heme/lymph: negative for bleeding, bruising, pallor    Physical Exam    Visit Vitals  /83   Ht 5' 7\" (1.702 m)   Wt 172 lb 6.4 oz (78.2 kg)   LMP 10/15/2020   BMI 27.00 kg/m²     Constitutional  · Appearance: well-nourished, well developed, alert, in no acute distress    HENT  · Head and Face: appears normal    Neck  · Inspection/Palpation: normal appearance, no masses or tenderness  · Lymph Nodes: no lymphadenopathy present  · Thyroid: gland size normal, nontender, no nodules or masses present on palpation    Chest  · Respiratory Effort: breathing normal  · Auscultation: normal breath sounds    Cardiovascular  · Heart:  · Auscultation: regular rate and rhythm without murmur    Breasts  · Inspection of Breasts: breasts symmetrical, no skin changes, no discharge present, nipple appearance normal, no skin retraction present  · Palpation of Breasts and Axillae: no masses present on palpation, no breast tenderness  · Axillary Lymph Nodes: no lymphadenopathy present    Gastrointestinal  · Abdominal Examination: abdomen non-tender to palpation, normal bowel sounds, no masses present  · Liver and spleen: no hepatomegaly present, spleen not palpable  · Hernias: no hernias identified    Skin  · General Inspection: no rash, no lesions identified    Neurologic/Psychiatric  · Mental Status:  · Orientation: grossly oriented to person, place and time  · Mood and Affect: mood normal, affect appropriate    Genitourinary  · External Genitalia: normal appearance for age, no discharge present, no tenderness present, no inflammatory lesions present, no masses present, no atrophy present, left bartholin cyst  · Vagina: normal vaginal vault without central or paravaginal defects, no discharge present, no inflammatory lesions present, no masses present  · Bladder: non-tender to palpation  · Urethra: appears normal  · Cervix: normal   · Uterus: normal size, shape and consistency  · Adnexa: no adnexal tenderness present, no adnexal masses present  · Perineum: perineum within normal limits, no evidence of trauma, no rashes or skin lesions present  · Anus: anus within normal limits, no hemorrhoids present  · Inguinal Lymph Nodes: no lymphadenopathy present    Assessment:  Routine gynecologic examination  Her current medical status is satisfactory with no evidence of significant gynecologic issues.     Plan:  Counseled re: diet, exercise, healthy lifestyle  Return for yearly wellness visits  Rec annual mammogram  Pap/HPV  She will see if her mother has been tested for way syndrome

## 2020-10-19 NOTE — PATIENT INSTRUCTIONS
Well Visit, Ages 25 to 48: Care Instructions Your Care Instructions Physical exams can help you stay healthy. Your doctor has checked your overall health and may have suggested ways to take good care of yourself. He or she also may have recommended tests. At home, you can help prevent illness with healthy eating, regular exercise, and other steps. Follow-up care is a key part of your treatment and safety. Be sure to make and go to all appointments, and call your doctor if you are having problems. It's also a good idea to know your test results and keep a list of the medicines you take. How can you care for yourself at home? · Reach and stay at a healthy weight. This will lower your risk for many problems, such as obesity, diabetes, heart disease, and high blood pressure. · Get at least 30 minutes of physical activity on most days of the week. Walking is a good choice. You also may want to do other activities, such as running, swimming, cycling, or playing tennis or team sports. Discuss any changes in your exercise program with your doctor. · Do not smoke or allow others to smoke around you. If you need help quitting, talk to your doctor about stop-smoking programs and medicines. These can increase your chances of quitting for good. · Talk to your doctor about whether you have any risk factors for sexually transmitted infections (STIs). Having one sex partner (who does not have STIs and does not have sex with anyone else) is a good way to avoid these infections. · Use birth control if you do not want to have children at this time. Talk with your doctor about the choices available and what might be best for you. · Protect your skin from too much sun. When you're outdoors from 10 a.m. to 4 p.m., stay in the shade or cover up with clothing and a hat with a wide brim. Wear sunglasses that block UV rays. Even when it's cloudy, put broad-spectrum sunscreen (SPF 30 or higher) on any exposed skin. · See a dentist one or two times a year for checkups and to have your teeth cleaned. · Wear a seat belt in the car. Follow your doctor's advice about when to have certain tests. These tests can spot problems early. For everyone · Cholesterol. Have the fat (cholesterol) in your blood tested after age 21. Your doctor will tell you how often to have this done based on your age, family history, or other things that can increase your risk for heart disease. · Blood pressure. Have your blood pressure checked during a routine doctor visit. Your doctor will tell you how often to check your blood pressure based on your age, your blood pressure results, and other factors. · Vision. Talk with your doctor about how often to have a glaucoma test. 
· Diabetes. Ask your doctor whether you should have tests for diabetes. · Colon cancer. Your risk for colorectal cancer gets higher as you get older. Some experts say that adults should start regular screening at age 48 and stop at age 76. Others say to start before age 48 or continue after age 76. Talk with your doctor about your risk and when to start and stop screening. For women · Breast exam and mammogram. Talk to your doctor about when you should have a clinical breast exam and a mammogram. Medical experts differ on whether and how often women under 50 should have these tests. Your doctor can help you decide what is right for you. · Cervical cancer screening test and pelvic exam. Begin with a Pap test at age 24. The test often is part of a pelvic exam. Starting at age 27, you may choose to have a Pap test, an HPV test, or both tests at the same time (called co-testing). Talk with your doctor about how often to have testing. · Tests for sexually transmitted infections (STIs). Ask whether you should have tests for STIs. You may be at risk if you have sex with more than one person, especially if your partners do not wear condoms. For men · Tests for sexually transmitted infections (STIs). Ask whether you should have tests for STIs. You may be at risk if you have sex with more than one person, especially if you do not wear a condom. · Testicular cancer exam. Ask your doctor whether you should check your testicles regularly. · Prostate exam. Talk to your doctor about whether you should have a blood test (called a PSA test) for prostate cancer. Experts differ on whether and when men should have this test. Some experts suggest it if you are older than 39 and are -American or have a father or brother who got prostate cancer when he was younger than 72. When should you call for help? Watch closely for changes in your health, and be sure to contact your doctor if you have any problems or symptoms that concern you. Where can you learn more? Go to http://www.ng.com/ Enter P072 in the search box to learn more about \"Well Visit, Ages 25 to 48: Care Instructions. \" Current as of: May 27, 2020               Content Version: 12.6 © 2006-2020 [employer]. Care instructions adapted under license by [employer] (which disclaims liability or warranty for this information). If you have questions about a medical condition or this instruction, always ask your healthcare professional. [address] any warranty or liability for your use of this information.

## 2020-10-27 LAB
CYTOLOGIST CVX/VAG CYTO: ABNORMAL
CYTOLOGY CVX/VAG DOC CYTO: ABNORMAL
CYTOLOGY CVX/VAG DOC THIN PREP: ABNORMAL
CYTOLOGY HISTORY:: ABNORMAL
DX ICD CODE: ABNORMAL
DX ICD CODE: ABNORMAL
HPV I/H RISK 1 DNA CVX QL PROBE+SIG AMP: ABNORMAL
HPV I/H RISK 4 DNA CVX QL PROBE+SIG AMP: NEGATIVE
Lab: ABNORMAL
OTHER STN SPEC: ABNORMAL
PATHOLOGIST CVX/VAG CYTO: ABNORMAL
STAT OF ADQ CVX/VAG CYTO-IMP: ABNORMAL

## 2020-10-27 NOTE — PROGRESS NOTES
Added to chart, pmh. Left message for pt to call office in referece to lab results.  Tickled X3 years

## 2020-11-05 RX ORDER — NORGESTIMATE AND ETHINYL ESTRADIOL 0.25-0.035
1 KIT ORAL DAILY
Qty: 3 PACKAGE | Refills: 4 | Status: SHIPPED | OUTPATIENT
Start: 2020-11-05 | End: 2021-11-01 | Stop reason: SDUPTHER

## 2020-11-25 RX ORDER — CLOBETASOL PROPIONATE 0.5 MG/G
CREAM TOPICAL
Qty: 60 G | Refills: 2 | Status: SHIPPED | OUTPATIENT
Start: 2020-11-25 | End: 2021-11-01 | Stop reason: SDUPTHER

## 2021-11-01 ENCOUNTER — OFFICE VISIT (OUTPATIENT)
Dept: OBGYN CLINIC | Age: 51
End: 2021-11-01
Payer: COMMERCIAL

## 2021-11-01 VITALS — SYSTOLIC BLOOD PRESSURE: 148 MMHG | WEIGHT: 161 LBS | BODY MASS INDEX: 25.22 KG/M2 | DIASTOLIC BLOOD PRESSURE: 90 MMHG

## 2021-11-01 DIAGNOSIS — Z01.419 ENCOUNTER FOR GYNECOLOGICAL EXAMINATION (GENERAL) (ROUTINE) WITHOUT ABNORMAL FINDINGS: Primary | ICD-10-CM

## 2021-11-01 PROCEDURE — 99396 PREV VISIT EST AGE 40-64: CPT | Performed by: OBSTETRICS & GYNECOLOGY

## 2021-11-01 RX ORDER — CLOBETASOL PROPIONATE 0.5 MG/G
CREAM TOPICAL
Qty: 60 G | Refills: 2 | Status: SHIPPED | OUTPATIENT
Start: 2021-11-01

## 2021-11-01 RX ORDER — NORGESTIMATE AND ETHINYL ESTRADIOL 0.25-0.035
1 KIT ORAL DAILY
Qty: 3 DOSE PACK | Refills: 0 | Status: SHIPPED | OUTPATIENT
Start: 2021-11-01 | End: 2022-03-24

## 2021-11-01 NOTE — PATIENT INSTRUCTIONS

## 2021-11-01 NOTE — PROGRESS NOTES
Kishan Price is a ,  49 y.o. female [address] [facility] whose LMP was on 10/14/2021 who presents for her annual checkup. She is having no significant problems. Menstrual status:    Her periods are light in flow. She is using one to two pads or tampons per day, usually regular and last 26-30 days. She denies dysmenorrhea. She reports no premenstrual symptoms. The patient is not using HRT. Contraception:    The current method of family planning is OCP (estrogen/progesterone). Sexual history:    She  reports being sexually active and has had partner(s) who are Male. She reports using the following method of birth control/protection: Pill. Medical conditions:    Since her last annual GYN exam about one year ago, she has had the following changes in her health history: none. Pap and Mammogram History:    Her most recent Pap smear was 10/19/2020 ASCUS/HPV neg repeat     The patient had a mammogram 2021 mass in R breast was found. Stable    Breast Cancer History/Substance Abuse:    She has is family history of breast cancer. Mother 66's    Osteoporosis History:    Family history does not include a first or second degree relative with osteopenia or osteoporosis. Past Medical History:   Diagnosis Date    Abnormal Pap smear of cervix 10/19/2020    ASCUS/-HPV    Acne     Depression     Lichen sclerosus     OCD (obsessive compulsive disorder)      Past Surgical History:   Procedure Laterality Date    HX BREAST BIOPSY  3/21/2012    normal path    HX OOPHORECTOMY Right 2004     Current Outpatient Medications   Medication Sig Dispense Refill    clobetasoL (TEMOVATE) 0.05 % topical cream APPLY TO AFFECTED AREA 2 TIMES A WEEK AS DIRECTED 60 g 2    norgestimate-ethinyl estradioL (ORTHO-CYCLEN, SPRINTEC) 0.25-35 mg-mcg tab Take 1 Tab by mouth daily. 3 Package 4    lisinopril-hydroCHLOROthiazide (PRINZIDE, ZESTORETIC) 20-12.5 mg per tablet Take  by mouth daily.       fluvoxaMINE (LUVOX) 100 mg tablet        Allergies: Patient has no known allergies. Social History     Socioeconomic History    Marital status:      Spouse name: Not on file    Number of children: Not on file    Years of education: Not on file    Highest education level: Not on file   Occupational History    Not on file   Tobacco Use    Smoking status: Never Smoker    Smokeless tobacco: Never Used   Substance and Sexual Activity    Alcohol use: Not on file    Drug use: Not on file    Sexual activity: Yes     Partners: Male     Birth control/protection: Pill   Other Topics Concern    Not on file   Social History Narrative    Not on file     Social Determinants of Health     Financial Resource Strain:     Difficulty of Paying Living Expenses:    Food Insecurity:     Worried About Running Out of Food in the Last Year:     [address] in the Last Year:    Transportation Needs:     Lack of Transportation (Medical):  Lack of Transportation (Non-Medical):    Physical Activity:     Days of Exercise per Week:     Minutes of Exercise per Session:    Stress:     Feeling of Stress :    Social Connections:     Frequency of Communication with Friends and Family:     Frequency of Social Gatherings with Friends and Family:     Attends [employer] Services:     Active Member of Clubs or Organizations:     Attends Club or Organization Meetings:     Marital Status:    Intimate Partner Violence:     Fear of Current or Ex-Partner:     Emotionally Abused:     Physically Abused:     Sexually Abused: Tobacco History:  reports that she has never smoked. She has never used smokeless tobacco.  Alcohol Abuse:  has no history on file for alcohol use. Drug Abuse:  has no history on file for drug use.   Patient Active Problem List   Diagnosis Code    Lichen sclerosus et atrophicus of the vulva N90.4         Review of Systems - History obtained from the patient  Constitutional: negative for weight loss, fever, night sweats  HEENT: negative for hearing loss, earache, congestion, snoring, sorethroat  CV: negative for chest pain, palpitations, edema  Resp: negative for cough, shortness of breath, wheezing  GI: negative for change in bowel habits, abdominal pain, black or bloody stools  : negative for frequency, dysuria, hematuria, vaginal discharge  MSK: negative for back pain, joint pain, muscle pain  Breast: negative for breast lumps, nipple discharge, galactorrhea  Skin :negative for itching, rash, hives  Neuro: negative for dizziness, headache, confusion, weakness  Psych: negative for anxiety, depression, change in mood  Heme/lymph: negative for bleeding, bruising, pallor    Physical Exam    Visit Vitals  BP (!) 148/90   Wt 161 lb (73 kg)   LMP 10/14/2021 (Exact Date)   BMI 25.22 kg/m²     Constitutional  · Appearance: well-nourished, well developed, alert, in no acute distress    HENT  · Head and Face: appears normal    Neck  · Inspection/Palpation: normal appearance, no masses or tenderness  · Lymph Nodes: no lymphadenopathy present  · Thyroid: gland size normal, nontender, no nodules or masses present on palpation    Chest  · Respiratory Effort: breathing normal  · Auscultation: normal breath sounds    Cardiovascular  · Heart:  · Auscultation: regular rate and rhythm without murmur    Breasts  · Inspection of Breasts: breasts symmetrical, no skin changes, no discharge present, nipple appearance normal, no skin retraction present  · Palpation of Breasts and Axillae: no masses present on palpation, no breast tenderness  · Axillary Lymph Nodes: no lymphadenopathy present    Gastrointestinal  · Abdominal Examination: abdomen non-tender to palpation, normal bowel sounds, no masses present  · Liver and spleen: no hepatomegaly present, spleen not palpable  · Hernias: no hernias identified    Skin  · General Inspection: no rash, no lesions identified    Neurologic/Psychiatric  · Mental Status:  · Orientation: grossly oriented to person, place and time  · Mood and Affect: mood normal, affect appropriate    Genitourinary  · External Genitalia: normal appearance for age, no discharge present, no tenderness present, no inflammatory lesions present, no masses present, no atrophy present, left bartholin cyst, LSA changes  · Vagina: normal vaginal vault without central or paravaginal defects, no discharge present, no inflammatory lesions present, no masses present  · Bladder: non-tender to palpation  · Urethra: appears normal  · Cervix: normal   · Uterus: normal size, shape and consistency  · Adnexa: no adnexal tenderness present, no adnexal masses present  · Perineum: perineum within normal limits, no evidence of trauma, no rashes or skin lesions present  · Anus: anus within normal limits, no hemorrhoids present  · Inguinal Lymph Nodes: no lymphadenopathy present    Assessment:  Routine gynecologic examination  Her current medical status is satisfactory with no evidence of significant gynecologic issues. LSA  Left bartholin - stable  Plan:  Counseled re: diet, exercise, healthy lifestyle  Return for yearly wellness visits  Rec annual mammogram  Cont clobetasol 2x/week   Take BP meds regularly! Will give 3 months of OCP - needs to send me normal BP from home to continue. Disc risk of stroke if BP elevated on OCP.  Given Mirena information - hx of anovulation and still a good choice

## 2022-03-19 PROBLEM — N90.4 LICHEN SCLEROSUS ET ATROPHICUS OF THE VULVA: Status: ACTIVE | Noted: 2017-07-24

## 2022-03-24 RX ORDER — NORGESTIMATE AND ETHINYL ESTRADIOL 0.25-0.035
1 KIT ORAL DAILY
Qty: 3 DOSE PACK | Refills: 3 | Status: SHIPPED | OUTPATIENT
Start: 2022-03-24 | End: 2022-11-04 | Stop reason: SDUPTHER

## 2022-11-04 ENCOUNTER — OFFICE VISIT (OUTPATIENT)
Dept: OBGYN CLINIC | Age: 52
End: 2022-11-04
Payer: COMMERCIAL

## 2022-11-04 DIAGNOSIS — N90.4 LICHEN SCLEROSUS ET ATROPHICUS OF THE VULVA: ICD-10-CM

## 2022-11-04 DIAGNOSIS — Z01.419 ENCOUNTER FOR GYNECOLOGICAL EXAMINATION (GENERAL) (ROUTINE) WITHOUT ABNORMAL FINDINGS: Primary | ICD-10-CM

## 2022-11-04 DIAGNOSIS — Z76.89 ENCOUNTER FOR MENSTRUAL REGULATION: ICD-10-CM

## 2022-11-04 DIAGNOSIS — N75.0 CYST OF LEFT BARTHOLIN'S GLAND: ICD-10-CM

## 2022-11-04 DIAGNOSIS — N93.9 ABNORMAL UTERINE BLEEDING: ICD-10-CM

## 2022-11-04 PROCEDURE — 99396 PREV VISIT EST AGE 40-64: CPT | Performed by: OBSTETRICS & GYNECOLOGY

## 2022-11-04 RX ORDER — NORGESTIMATE AND ETHINYL ESTRADIOL 0.25-0.035
1 KIT ORAL DAILY
Qty: 3 DOSE PACK | Refills: 4 | Status: SHIPPED | OUTPATIENT
Start: 2022-11-04

## 2022-11-04 RX ORDER — CLOBETASOL PROPIONATE 0.5 MG/G
CREAM TOPICAL
Qty: 60 G | Refills: 2 | Status: CANCELLED | OUTPATIENT
Start: 2022-11-04

## 2022-11-04 NOTE — PROGRESS NOTES
[address] [facility]  [email or web address]  [phone]           Annual Gynecologic Exam  WWE 40-60  Chief Complaint   Patient presents with    Well Woman         Isabelle Flurry is a 47 y.o.  [address] [facility]  female who presents for an annual exam.  No LMP recorded. .    Patient has the following concerns today: happy with OCP, Dr. Lora Bee recommended pt to keep taking OCP. Pt reports feeling more warm blooded, pt reports some trouble falling asleep, low libido, pt uses lube with intercourse. Mother had colon cancer, endometrial cancer ~52, colon cancer ~65. Menstrual status:  She does not report dysmenorrhea/painful menses. She does not report heavy menses. She does not report irregular bleeding. Sexual history and Contraception:  Social History     Substance and Sexual Activity   Sexual Activity Yes    Partners: Male    Birth control/protection: Pill       She does not reports new sexual partner(s) in the last year. Preventive Medicine History:  Her most recent Pap smear result: normal was obtained in 2020    Her most recent HR HPV screen was not obtained in     She does not have a history of IKE 2, 3 or cervical cancer. Breast health:  [facility] Results (most recent):  Results from Orders Only encounter on 16    [facility] [address]      Last mammogram: approximate date 2022 and was normal.   Breast cancer family updated: see FH.      Past Medical History:   Diagnosis Date    Abnormal Pap smear of cervix 10/19/2020    ASCUS/-HPV    Acne     Depression     Endometriosis     on ovary    Lichen sclerosus     OCD (obsessive compulsive disorder)      OB History    Para Term  AB Living   2 2 2 0 0 2   SAB IAB Ectopic Molar Multiple Live Births   0 0 0   0        # Outcome Date GA Lbr Drew/2nd Weight Sex Delivery Anes PTL Lv   2 Term            1 Term                Past Surgical History:   Procedure Laterality Date    HX BREAST BIOPSY 3/21/2012    normal path    HX OOPHORECTOMY Right 6/2004     Family History   Problem Relation Age of Onset    Hypertension Mother     Cancer Mother         uterine CA    Hypertension Father     Cancer Father         Kidney CA     Social History     Socioeconomic History    Marital status:      Spouse name: Not on file    Number of children: Not on file    Years of education: Not on file    Highest education level: Not on file   Occupational History    Not on file   Tobacco Use    Smoking status: Never    Smokeless tobacco: Never   Substance and Sexual Activity    Alcohol use: Not on file    Drug use: Not on file    Sexual activity: Yes     Partners: Male     Birth control/protection: Pill   Other Topics Concern    Not on file   Social History Narrative    Not on file     Social Determinants of Health     Financial Resource Strain: Not on file   Food Insecurity: Not on file   Transportation Needs: Not on file   Physical Activity: Not on file   Stress: Not on file   Social Connections: Not on file   Intimate Partner Violence: Not on file   Housing Stability: Not on file       No Known Allergies    Current Outpatient Medications   Medication Sig    norgestimate-ethinyl estradioL (ORTHO-CYCLEN, SPRINTEC) 0.25-35 mg-mcg tab Take 1 Tablet by mouth daily. lisinopril-hydroCHLOROthiazide (PRINZIDE, ZESTORETIC) 20-12.5 mg per tablet Take  by mouth daily. fluvoxaMINE (LUVOX) 100 mg tablet     clobetasoL (TEMOVATE) 0.05 % topical cream APPLY TO AFFECTED AREA 2 TIMES A WEEK AS DIRECTED     No current facility-administered medications for this visit.        Patient Active Problem List   Diagnosis Code    Lichen sclerosus et atrophicus of the vulva N90.4         Review of Systems - History obtained from the patient and patient filled out questionnaire   Constitutional/general, HEENT, CV, Resp, GI, MSK, Neuro, Psych, Heme/lymph, Skin, Breast ROS: no significant complaints except as noted on HPI    Physical Exam  Visit Vitals  BP (P) 104/68   Pulse (P) 78   Ht (P) 5' 6.5\" (1.689 m)   Wt (P) 164 lb 3.2 oz (74.5 kg)   BMI (P) 26.11 kg/m²       Constitutional  Appearance: well-nourished, well developed, alert, in no acute distress    HENT  Head and Face: appears normal    Neck  Inspection/Palpation: normal appearance, no masses or tenderness  Lymph Nodes: no lymphadenopathy present  Thyroid: gland size normal, nontender, no nodules or masses present on palpation    Chest  Respiratory Effort: breathing unlabored  Auscultation: normal breath sounds    Cardiovascular  Heart:   Auscultation: regular rate and rhythm without murmur    Breasts  Inspection of Breasts: breasts symmetrical, no skin changes, no discharge present, nipple appearance normal, no skin retraction present  Palpation of Breasts and Axillae: no masses present on palpation, no breast tenderness  Axillary Lymph Nodes: no lymphadenopathy present    Gastrointestinal  Abdominal Examination: abdomen non-tender to palpation, normal bowel sounds, no masses present  Liver and spleen: no hepatomegaly present, spleen not palpable  Hernias: no hernias identified    Genitourinary  External Genitalia: atrophic appearance for age c/o LS, no discharge present, no tenderness present, no inflammatory lesions present, left soft bartholin cyst about 3 cm (present x 10 yrs per pt)  Vagina: normal vaginal vault without central or paravaginal defects, minimal discharge present, no inflammatory lesions present, no masses present  Bladder: non-tender to palpation  Urethra: appears normal  Cervix: normal   Uterus: normal size, shape and consistency  Adnexa: no adnexal tenderness present, no adnexal masses present  Perineum: perineum within normal limits, no evidence of trauma, no rashes or skin lesions present  Anus: anus within normal limits, no hemorrhoids present  Inguinal Lymph Nodes: no lymphadenopathy present    Skin  General Inspection: no rash, no lesions identified    Neurologic/Psychiatric  Mental Status:  Orientation: grossly oriented to person, place and time  Mood and Affect: mood normal, affect appropriate    Assessment:  47 y.o.  for well woman exam  Encounter Diagnoses   Name Primary? Encounter for gynecological examination (general) (routine) without abnormal findings Yes    Lichen sclerosus et atrophicus of the vulva     Encounter for menstrual regulation     Abnormal uterine bleeding     Cyst of left Bartholin's gland        Plan:  The patient was counseled about diet, exercise, healthy lifestyle  We discussed current pap smear and HR HPV testing guidelines. I recommended follow up one year for routine annual gynecologic exam or sooner prn  Handouts were given to the patient  I recommended follow up with a primary care physician for chronic medical problems and evaluation of non-gynecologic concerns and to please contact our office with any GYN questions or concerns. I recommended testing per CDC guidelines and at patient request.     Discussed how to apply clobetasol and weekly maintenance  Rec vulvar check q 6mos  Discussed risks, benefits and alternatives of OCP/nuvaring/patch: including but not limited to dvt/pe/mi/cva/ca/gi risks and that smoking, increasing age and other health conditions can increase these risks. We discussed progesterone only and non hormonal options for contraception including but not limited to condoms, IUDs, Nexplanon, and depo provera.   Pt wants to continue current OCP for now  Disc option of transitioning to HRT< will defer, needs contraception  Disc IandD option for draining bartholin cyst; prefers observation/stable/tolerable  Fu and US; AUB; with strain: can do w 6mos fu if stable, poss endo bx, but suspect not an endometrial source: ? From cervix/bartholin cyst    Folllow up:  [x] return for annual well woman exam in one year or sooner if she is having problems  [] follow up and ultrasound  [] 6 months  [] 3 months  [] 6 weeks   [] 1 month    Orders Placed This Encounter    norgestimate-ethinyl estradioL (ORTHO-CYCLEN, [facility]) 0.25-35 mg-mcg tab       No results found for any visits on 11/04/22.

## 2023-01-15 ENCOUNTER — TELEPHONE (OUTPATIENT)
Dept: OBGYN CLINIC | Age: 53
End: 2023-01-15

## 2023-01-16 NOTE — TELEPHONE ENCOUNTER
47year old patient saw MD on 11/4/2022 for ae    Please advise regarding requested refill last sent on 11/1/2021    Please advise regarding pended medication  Thank you

## 2023-01-17 RX ORDER — CLOBETASOL PROPIONATE 0.5 MG/G
CREAM TOPICAL
Qty: 60 G | Refills: 2 | Status: SHIPPED | OUTPATIENT
Start: 2023-01-17

## 2023-05-15 ENCOUNTER — PROCEDURE VISIT (OUTPATIENT)
Age: 53
End: 2023-05-15
Payer: COMMERCIAL

## 2023-05-15 VITALS
BODY MASS INDEX: 26.18 KG/M2 | WEIGHT: 166.8 LBS | SYSTOLIC BLOOD PRESSURE: 119 MMHG | HEART RATE: 72 BPM | HEIGHT: 67 IN | DIASTOLIC BLOOD PRESSURE: 75 MMHG

## 2023-05-15 DIAGNOSIS — N93.9 ABNORMAL UTERINE BLEEDING (AUB): ICD-10-CM

## 2023-05-15 DIAGNOSIS — R93.89 ABNORMAL PELVIC ULTRASOUND: ICD-10-CM

## 2023-05-15 DIAGNOSIS — Z01.812 PRE-PROCEDURE LAB EXAM: ICD-10-CM

## 2023-05-15 DIAGNOSIS — N90.4 LICHEN SCLEROSUS ET ATROPHICUS OF THE VULVA: Primary | ICD-10-CM

## 2023-05-15 PROCEDURE — 99213 OFFICE O/P EST LOW 20 MIN: CPT | Performed by: OBSTETRICS & GYNECOLOGY

## 2023-05-15 PROCEDURE — 58100 BIOPSY OF UTERUS LINING: CPT | Performed by: OBSTETRICS & GYNECOLOGY

## 2023-05-15 RX ORDER — CLOBETASOL PROPIONATE 0.5 MG/G
OINTMENT TOPICAL
Qty: 45 G | Refills: 1 | Status: SHIPPED | OUTPATIENT
Start: 2023-05-15

## 2023-05-15 NOTE — PROGRESS NOTES
Rooming note, gyn problem visit:    Chief Complaint   Patient presents with    Follow-up     LS    Ultrasound     AUB - poss endo bx     Adelaida Lopez is a 47 y.o. female presents for a problem visit. Patient's last menstrual period was 04/27/2023. Birth Control: condoms sometimes. Last Pap: approximate date 10/2020 and was abnormal: ascus  Last or next [address] is: 11/4/22    The patient is reporting having: pt reports no AUB in between cycles the past 1-2 months. Pt reports 7 days before her cycle she would see spotting when bearing down for a BM/straining. Ultrasound today to kayla AUB & poss endo bx. Last seen 11/2022. Vulvar check today too check LS, she has been using clobetasol sometimes 3x per week recently d/t itching, usually uses 2x per week. She has experienced this problem before. She reports the symptoms are is unchanged. Aggravating factors include none. And alleviating factors include none. She does not have other concerns.

## 2023-05-15 NOTE — PROGRESS NOTES
[facility]  [email or web address]  [phone]    Jose Simeon MD, [address] [facility]    OB/GYN Problem visit    HPI  Eri Wang is a No obstetric history on file. , 47 y.o. female who presents for a problem visit. Chief Complaint   Patient presents with    Follow-up     LS    Ultrasound     AUB - poss endo bx       Pt here for LS follow up: using steroid. She is also here for a separate issue: FH endometrial biopsy some spotting/staining with strain that has resolved. Per Rooming Note:  Eri Wang is a 47 y.o. female presents for a problem visit. Patient's last menstrual period was 04/27/2023. Birth Control: condoms sometimes. Last Pap: approximate date 10/2020 and was abnormal: ascus  Last or next [address] is: 11/4/22     The patient is reporting having: pt reports no AUB in between cycles the past 1-2 months. Pt reports 7 days before her cycle she would see spotting when bearing down for a BM/straining. Ultrasound today to kayla AUB & poss endo bx. Last seen 11/2022. Vulvar check today too check LS, she has been using clobetasol sometimes 3x per week recently d/t itching, usually uses 2x per week. She has experienced this problem before. She reports the symptoms are is unchanged. Aggravating factors include none. And alleviating factors include none. She does not have other concerns.     Sexual history and Contraception:  Social History     Substance and Sexual Activity   Sexual Activity Yes    Partners: Male    Birth control/protection: Pill     Past Medical History:   Diagnosis Date    Abnormal Pap smear of cervix 10/19/2020    ASCUS/-HPV    Acne     Depression     Endometriosis     on ovary    Lichen sclerosus     OCD (obsessive compulsive disorder)        [unfilled]  Current Outpatient Medications   Medication Sig    clobetasol (TEMOVATE) 0.05 % cream APPLY TO AFFECTED AREA 2 TIMES A WEEK AS DIRECTED    fluvoxaMINE (LUVOX) 100 MG tablet

## 2023-05-16 NOTE — PROGRESS NOTES
US reviewed  Physician review of ultrasound performed by technician    Today's ultrasound report and images were reviewed and discussed with the patient.   Please see images and imaging report entered by technician in PACS for more detail and progress note and diagnosis entered by MD.    Dilcia Trujillo MD

## 2023-05-19 LAB
CPT BILLING CODE: NORMAL
DIAGNOSIS SYNOPSIS:: NORMAL
DX ICD CODE: NORMAL
DX ICD CODE: NORMAL
PATH REPORT.FINAL DX SPEC: NORMAL
PATH REPORT.GROSS SPEC: NORMAL
PATH REPORT.SITE OF ORIGIN SPEC: NORMAL
PATHOLOGIST NAME: NORMAL
PAYMENT PROCEDURE: NORMAL

## 2023-05-23 ENCOUNTER — TELEPHONE (OUTPATIENT)
Age: 53
End: 2023-05-23

## 2023-05-23 NOTE — TELEPHONE ENCOUNTER
----- Message from Tian Mcgovern MD sent at 5/20/2023  8:42 PM EDT -----  Pathology: benign endometrium (add to notes for PMH)  Notify pt if Byban message not read or not active.   Update PMH: Endometrial biopsy, date of procedure and in notes: include pathology above

## 2023-05-23 NOTE — TELEPHONE ENCOUNTER
Called pt & informed her of results & recommendations & advised tp review  message for log & weblinks.  Pt wants to schedule her ae & mammo for our office but will call back to schedule  No further questions

## 2023-11-10 RX ORDER — NORGESTIMATE AND ETHINYL ESTRADIOL 0.25-0.035
1 KIT ORAL DAILY
Qty: 1 PACKET | Refills: 0 | Status: SHIPPED | OUTPATIENT
Start: 2023-11-10

## 2023-11-10 NOTE — TELEPHONE ENCOUNTER
46 yo last ae 11/4/22  Rx requested for ocp to get PT to next ae scheduled 11/1523. Ocp sent per MD verbal order to get PT to ae/ov.

## 2023-11-15 ENCOUNTER — OFFICE VISIT (OUTPATIENT)
Age: 53
End: 2023-11-15
Payer: COMMERCIAL

## 2023-11-15 VITALS — WEIGHT: 168 LBS | BODY MASS INDEX: 26.71 KG/M2 | SYSTOLIC BLOOD PRESSURE: 128 MMHG | DIASTOLIC BLOOD PRESSURE: 83 MMHG

## 2023-11-15 DIAGNOSIS — Z11.51 ENCOUNTER FOR SCREENING FOR HUMAN PAPILLOMAVIRUS (HPV): ICD-10-CM

## 2023-11-15 DIAGNOSIS — Z01.419 ENCOUNTER FOR GYNECOLOGICAL EXAMINATION: Primary | ICD-10-CM

## 2023-11-15 DIAGNOSIS — R30.0 DYSURIA: ICD-10-CM

## 2023-11-15 DIAGNOSIS — Z12.4 CERVICAL CANCER SCREENING: ICD-10-CM

## 2023-11-15 LAB
BILIRUBIN, URINE, POC: NEGATIVE
BLOOD URINE, POC: NEGATIVE
GLUCOSE URINE, POC: NEGATIVE
KETONES, URINE, POC: NEGATIVE
LEUKOCYTE ESTERASE, URINE, POC: NEGATIVE
NITRITE, URINE, POC: NEGATIVE
PH, URINE, POC: 7.5 (ref 4.6–8)
PROTEIN,URINE, POC: NEGATIVE
SPECIFIC GRAVITY, URINE, POC: 1.01 (ref 1–1.03)
URINALYSIS CLARITY, POC: CLEAR
URINALYSIS COLOR, POC: YELLOW
UROBILINOGEN, POC: NORMAL

## 2023-11-15 PROCEDURE — 81003 URINALYSIS AUTO W/O SCOPE: CPT | Performed by: OBSTETRICS & GYNECOLOGY

## 2023-11-15 PROCEDURE — 99396 PREV VISIT EST AGE 40-64: CPT | Performed by: OBSTETRICS & GYNECOLOGY

## 2023-11-15 RX ORDER — NORGESTIMATE AND ETHINYL ESTRADIOL 0.25-0.035
1 KIT ORAL DAILY
Qty: 3 PACKET | Refills: 4 | Status: SHIPPED | OUTPATIENT
Start: 2023-11-15

## 2023-11-15 NOTE — PROGRESS NOTES
Jarrod Donaldson is a 47 y.o. female returns for an annual exam     Chief Complaint   Patient presents with    Annual Exam       Patient's last menstrual period was 11/08/2023. Her periods are light in flow and usually regular with a 26-32 day interval with 3-7 day duration. She does not have dysmenorrhea. Problems: some urinary burning  Birth Control: OCP. Last Pap: see report obtained 3 year(s) ago. She does not have a history of MADHU 2, 3 or cervical cancer. Last Mammogram: had her mammogram today in our office. It was see report. Last colonoscopy: see report obtained 2 year(s) ago. Examination chaperoned by Vernice Libman, LPN.

## 2023-11-15 NOTE — PROGRESS NOTES
[facility]  [email or web address]  [phone]    Kathleen Vera MD, [address] [facility]        Annual Gynecologic Exam:  Chief Complaint   Patient presents with    Annual Exam       Carina Gonzalez is a [id number],  47 y.o. female   Patient's last menstrual period was 11/08/2023. She presents for her annual checkup. She is having no problems. Bleeding shorter on OCP. Using back up contraception. Also uses due to inc risk of endometrial cancer. Having less lubrication and less interest in initiating intercourse. Pt co dysuria, mild. Also co cramping. Per Rooming Note:  Patient's last menstrual period was 11/08/2023. Her periods are light in flow and usually regular with a 26-32 day interval with 3-7 day duration. She does not have dysmenorrhea. Problems: no problems  Birth Control: OCP. Last Pap: see report obtained 3 year(s) ago. She does not have a history of MADHU 2, 3 or cervical cancer. Last Mammogram: had her mammogram today in our office. It was see report. Last colonoscopy: see report obtained 2 year(s) ago. Sexual history and Contraception:    Social History     Substance and Sexual Activity   Sexual Activity Yes    Partners: Male    Birth control/protection: Pill      Past Medical History:   Diagnosis Date    Abnormal Pap smear of cervix 10/19/2020    ASCUS/-HPV    Acne     Depression     Endometrial Biopsy 05/15/2023    Pathology: benign endometrium TP    Endometriosis     on ovary    Lichen sclerosus     OCD (obsessive compulsive disorder)      Current Outpatient Medications   Medication Sig Dispense Refill    norgestimate-ethinyl estradiol (ORTHO-CYCLEN) 0.25-35 MG-MCG per tablet Take 1 tablet by mouth daily 3 packet 4    clobetasol (TEMOVATE) 0.05 % ointment Apply a thin layer q week, increase to TIW prn LS flares.  45 g 1    fluvoxaMINE (LUVOX) 100 MG tablet ceived the following from [facility]: Outside name: fluvoxaMINE (LUVOX) 100

## 2023-11-20 LAB
CYTOLOGIST CVX/VAG CYTO: NORMAL
CYTOLOGY CVX/VAG DOC CYTO: NORMAL
CYTOLOGY CVX/VAG DOC THIN PREP: NORMAL
DX ICD CODE: NORMAL
HPV GENOTYPE REFLEX: NORMAL
HPV I/H RISK 4 DNA CVX QL PROBE+SIG AMP: NEGATIVE
Lab: NORMAL
OTHER STN SPEC: NORMAL
STAT OF ADQ CVX/VAG CYTO-IMP: NORMAL

## 2024-09-03 RX ORDER — CLOBETASOL PROPIONATE 0.5 MG/G
CREAM TOPICAL
Qty: 60 G | Refills: 2 | OUTPATIENT
Start: 2024-09-03

## 2024-09-10 ENCOUNTER — TELEPHONE (OUTPATIENT)
Age: 54
End: 2024-09-10

## 2024-09-11 RX ORDER — CLOBETASOL PROPIONATE 0.5 MG/G
OINTMENT TOPICAL
Qty: 45 G | Refills: 0 | Status: SHIPPED | OUTPATIENT
Start: 2024-09-11

## 2024-11-18 ENCOUNTER — OFFICE VISIT (OUTPATIENT)
Age: 54
End: 2024-11-18
Payer: COMMERCIAL

## 2024-11-18 VITALS — SYSTOLIC BLOOD PRESSURE: 129 MMHG | BODY MASS INDEX: 26.39 KG/M2 | DIASTOLIC BLOOD PRESSURE: 77 MMHG | WEIGHT: 166 LBS

## 2024-11-18 DIAGNOSIS — Z01.419 ENCOUNTER FOR GYNECOLOGICAL EXAMINATION: ICD-10-CM

## 2024-11-18 DIAGNOSIS — N90.4 LICHEN SCLEROSUS ET ATROPHICUS OF THE VULVA: Primary | ICD-10-CM

## 2024-11-18 PROCEDURE — 99396 PREV VISIT EST AGE 40-64: CPT | Performed by: OBSTETRICS & GYNECOLOGY

## 2024-11-18 PROCEDURE — 99459 PELVIC EXAMINATION: CPT | Performed by: OBSTETRICS & GYNECOLOGY

## 2024-11-18 RX ORDER — CLOBETASOL PROPIONATE 0.5 MG/G
OINTMENT TOPICAL
Qty: 45 G | Refills: 0 | Status: SHIPPED | OUTPATIENT
Start: 2024-11-18

## 2024-11-18 RX ORDER — NORGESTIMATE AND ETHINYL ESTRADIOL 0.25-0.035
1 KIT ORAL DAILY
Qty: 3 PACKET | Refills: 4 | Status: SHIPPED | OUTPATIENT
Start: 2024-11-18

## 2024-11-18 NOTE — PROGRESS NOTES
Mallorie Simons is a 54 y.o. female returns for an annual exam     Chief Complaint   Patient presents with    Annual Exam     Mammogram       Patient's last menstrual period was 11/07/2024.  Her periods are light in flow and usually regular with a 26-32 day interval with 3-7 day duration.  She does not have dysmenorrhea.  Problems: no problems  Birth Control: OCP (estrogen/progesterone).  Last Pap: normal obtained 1 year(s) ago.  She does not have a history of MADHU 2, 3 or cervical cancer.   Last Mammogram: had her mammogram today in our office.  It was see report.   Last colonoscopy: see report obtained 3 year(s) ago.      1. Have you been to the ER, urgent care clinic, or hospitalized since your last visit? No    2. Have you seen or consulted any other health care providers outside of the [facility] since your last visit? No    Examination chaperoned by Lori Abel LPN.

## 2024-11-18 NOTE — PROGRESS NOTES
[facility]  [phone]    Liz Fam MD, FACOG        Annual Gynecologic Exam:  Chief Complaint   Patient presents with    Annual Exam     Mammogram       Mallorie Simons is a ,  54 y.o. female   Patient's last menstrual period was 2024.    The patient presents for her annual gynecologic checkup.     The patient is having problems - LS fu, wwe.  Occ hot flashes.  More mood changes.  + stress at work.  Less interest in things.    Clobetasol q 1 week.    Decreased libido.      Per Rooming Note:  Patient's last menstrual period was 2024.  Her periods are light in flow and usually regular with a 26-32 day interval with 3-7 day duration.  She does not have dysmenorrhea.  Problems: no problems  Birth Control: OCP (estrogen/progesterone).  Last Pap: normal obtained 1 year(s) ago.  She does not have a history of MADHU 2, 3 or cervical cancer.   Last Mammogram: had her mammogram today in our office.  It was see report.   Last colonoscopy: see report obtained 3 year(s) ago.       Sexual history and Contraception:    Social History     Substance and Sexual Activity   Sexual Activity Yes    Partners: Male    Birth control/protection: Pill      Past Medical History:   Diagnosis Date    Abnormal Pap smear of cervix 10/19/2020    ASCUS/-HPV    Acne     Depression     Endometrial Biopsy 05/15/2023    Pathology: benign endometrium TP    Endometriosis     on ovary    H/O mammogram 2023    low risk    Lichen sclerosus     OCD (obsessive compulsive disorder)     Pap smear for cervical cancer screening 11/15/2023    wnl/hpv neg     Current Outpatient Medications   Medication Sig Dispense Refill    clobetasol (TEMOVATE) 0.05 % ointment Apply a thin layer q week, increase to TIW prn LS flares. 45 g 0    norgestimate-ethinyl estradiol (ORTHO-CYCLEN) 0.25-35 MG-MCG per tablet Take 1 tablet by mouth daily 3 packet 4    fluvoxaMINE (LUVOX) 100 MG tablet ceived the following from [facility]

## 2025-01-13 ENCOUNTER — TELEPHONE (OUTPATIENT)
Age: 55
End: 2025-01-13

## 2025-01-13 DIAGNOSIS — N90.4 LICHEN SCLEROSUS ET ATROPHICUS OF THE VULVA: ICD-10-CM

## 2025-01-14 NOTE — TELEPHONE ENCOUNTER
53 yo last ov/ae 11/18/24, next ov 6 mo check 5/19/25      Rx was last sent 11/18/24:  Clobetasol q 1 week.   Fu 6mos vulvar check, check bartholin and dec libido   Sig: Apply a thin layer q week, increase to TIW prn LS flares.       Rx pended for MD to review  Thank you

## 2025-01-15 RX ORDER — CLOBETASOL PROPIONATE 0.5 MG/G
OINTMENT TOPICAL
Qty: 45 G | Refills: 0 | Status: SHIPPED | OUTPATIENT
Start: 2025-01-15

## 2025-05-16 NOTE — PROGRESS NOTES
Mallorie Simons is a 54 y.o. female presents for a problem visit.    Chief Complaint   Patient presents with    Follow-up     No LMP recorded (lmp unknown).  Birth Control: OCP (estrogen/progesterone).  Last Pap: normal obtained 2 year(s) ago.    The patient is here today for a follow up from 6 months ago (bartholin cyst)    Examination chaperoned by LIZBETH LEON RN.

## 2025-05-19 ENCOUNTER — OFFICE VISIT (OUTPATIENT)
Age: 55
End: 2025-05-19
Payer: COMMERCIAL

## 2025-05-19 VITALS
DIASTOLIC BLOOD PRESSURE: 90 MMHG | SYSTOLIC BLOOD PRESSURE: 142 MMHG | BODY MASS INDEX: 25.74 KG/M2 | HEIGHT: 67 IN | HEART RATE: 74 BPM | WEIGHT: 164 LBS

## 2025-05-19 DIAGNOSIS — L90.0 LICHEN SCLEROSUS: ICD-10-CM

## 2025-05-19 DIAGNOSIS — N75.0 BARTHOLIN'S CYST: Primary | ICD-10-CM

## 2025-05-19 DIAGNOSIS — N90.4 LICHEN SCLEROSUS ET ATROPHICUS OF THE VULVA: ICD-10-CM

## 2025-05-19 PROCEDURE — 99213 OFFICE O/P EST LOW 20 MIN: CPT | Performed by: OBSTETRICS & GYNECOLOGY

## 2025-05-19 RX ORDER — CLOBETASOL PROPIONATE 0.5 MG/G
OINTMENT TOPICAL
Qty: 45 G | Refills: 0 | Status: SHIPPED | OUTPATIENT
Start: 2025-05-19

## 2025-05-19 SDOH — ECONOMIC STABILITY: FOOD INSECURITY: WITHIN THE PAST 12 MONTHS, YOU WORRIED THAT YOUR FOOD WOULD RUN OUT BEFORE YOU GOT MONEY TO BUY MORE.: NEVER TRUE

## 2025-05-19 SDOH — ECONOMIC STABILITY: FOOD INSECURITY: WITHIN THE PAST 12 MONTHS, THE FOOD YOU BOUGHT JUST DIDN'T LAST AND YOU DIDN'T HAVE MONEY TO GET MORE.: NEVER TRUE

## 2025-05-19 ASSESSMENT — PATIENT HEALTH QUESTIONNAIRE - PHQ9
SUM OF ALL RESPONSES TO PHQ QUESTIONS 1-9: 0
SUM OF ALL RESPONSES TO PHQ QUESTIONS 1-9: 0
1. LITTLE INTEREST OR PLEASURE IN DOING THINGS: NOT AT ALL
SUM OF ALL RESPONSES TO PHQ QUESTIONS 1-9: 0
SUM OF ALL RESPONSES TO PHQ QUESTIONS 1-9: 0
2. FEELING DOWN, DEPRESSED OR HOPELESS: NOT AT ALL

## 2025-05-19 NOTE — PROGRESS NOTES
GYN Problem Visit Note    Chief Complaint   Follow-up   No LMP recorded (lmp unknown)..      HPI  Mallorie Simons is a 54 y.o. female who complains of   Chief Complaint   Patient presents with    Follow-up       She presents for lichen sclerosis and Bartholin's cyst follow up.  She is using clobetasol once a week.  Her Bartholin cyst is not painful.     Per Nursing Note:  No LMP recorded (lmp unknown).  Birth Control: OCP (estrogen/progesterone).  Last Pap: normal obtained 2 year(s) ago.     The patient is here today for a follow up from 6 months ago (bartholin cyst)    Past Medical History:   Diagnosis Date    Abnormal Pap smear of cervix 10/19/2020    ASCUS/-HPV    Acne     Depression     Endometrial Biopsy 05/15/2023    Pathology: benign endometrium TP    Endometriosis     on ovary    H/O mammogram 12/05/2023    low risk    H/O mammogram 11/18/2024    low risk    Hypertension     Lichen sclerosus     OCD (obsessive compulsive disorder)     Pap smear for cervical cancer screening 11/15/2023    wnl/hpv neg     Past Surgical History:   Procedure Laterality Date    BREAST BIOPSY  3/21/2012    normal path    ENDOMETRIAL BIOPSY  05/15/2023    Pathology: benign endometrium TP    OVARY REMOVAL Right 6/2004     Social History     Occupational History    Not on file   Tobacco Use    Smoking status: Never    Smokeless tobacco: Never   Vaping Use    Vaping status: Never Used   Substance and Sexual Activity    Alcohol use: Yes     Comment: Socially    Drug use: Never    Sexual activity: Yes     Partners: Male     Birth control/protection: Pill     Family History   Problem Relation Age of Onset    Hypertension Mother     Breast Cancer Mother         73    Uterine Cancer Mother         50s    Colon Cancer Mother         65    Cancer Mother     Cancer Father         Kidney CA    Hypertension Father        No Known Allergies  Prior to Admission medications    Medication Sig Start Date End Date Taking? Authorizing Provider